# Patient Record
Sex: FEMALE | Race: WHITE | Employment: UNEMPLOYED | URBAN - METROPOLITAN AREA
[De-identification: names, ages, dates, MRNs, and addresses within clinical notes are randomized per-mention and may not be internally consistent; named-entity substitution may affect disease eponyms.]

---

## 2017-05-13 ENCOUNTER — HOSPITAL ENCOUNTER (EMERGENCY)
Age: 57
Discharge: HOME | End: 2017-05-13
Attending: EMERGENCY MEDICINE
Payer: COMMERCIAL

## 2017-05-13 ENCOUNTER — APPOINTMENT (OUTPATIENT)
Dept: GENERAL RADIOLOGY | Age: 57
End: 2017-05-13
Attending: EMERGENCY MEDICINE
Payer: COMMERCIAL

## 2017-05-13 VITALS
SYSTOLIC BLOOD PRESSURE: 125 MMHG | RESPIRATION RATE: 16 BRPM | DIASTOLIC BLOOD PRESSURE: 74 MMHG | HEART RATE: 80 BPM | BODY MASS INDEX: 31.59 KG/M2 | HEIGHT: 65 IN | WEIGHT: 189.6 LBS | TEMPERATURE: 98.7 F | OXYGEN SATURATION: 96 %

## 2017-05-13 DIAGNOSIS — R05.9 COUGH: ICD-10-CM

## 2017-05-13 DIAGNOSIS — R53.83 MALAISE AND FATIGUE: Primary | ICD-10-CM

## 2017-05-13 DIAGNOSIS — R53.81 MALAISE AND FATIGUE: Primary | ICD-10-CM

## 2017-05-13 LAB
ANION GAP BLD CALC-SCNC: 10 MMOL/L (ref 5–15)
BASOPHILS # BLD AUTO: 0 K/UL (ref 0–0.1)
BASOPHILS # BLD: 0 % (ref 0–1)
BUN SERPL-MCNC: 14 MG/DL (ref 6–20)
BUN/CREAT SERPL: 21 (ref 12–20)
CALCIUM SERPL-MCNC: 9.2 MG/DL (ref 8.5–10.1)
CHLORIDE SERPL-SCNC: 103 MMOL/L (ref 97–108)
CO2 SERPL-SCNC: 27 MMOL/L (ref 21–32)
CREAT SERPL-MCNC: 0.68 MG/DL (ref 0.55–1.02)
EOSINOPHIL # BLD: 0.5 K/UL (ref 0–0.4)
EOSINOPHIL NFR BLD: 6 % (ref 0–7)
ERYTHROCYTE [DISTWIDTH] IN BLOOD BY AUTOMATED COUNT: 14.1 % (ref 11.5–14.5)
GLUCOSE SERPL-MCNC: 98 MG/DL (ref 65–100)
HCT VFR BLD AUTO: 39.5 % (ref 35–47)
HGB BLD-MCNC: 12.8 G/DL (ref 11.5–16)
LYMPHOCYTES # BLD AUTO: 16 % (ref 12–49)
LYMPHOCYTES # BLD: 1.3 K/UL (ref 0.8–3.5)
MCH RBC QN AUTO: 29.1 PG (ref 26–34)
MCHC RBC AUTO-ENTMCNC: 32.4 G/DL (ref 30–36.5)
MCV RBC AUTO: 89.8 FL (ref 80–99)
MONOCYTES # BLD: 0.8 K/UL (ref 0–1)
MONOCYTES NFR BLD AUTO: 9 % (ref 5–13)
NEUTS SEG # BLD: 5.6 K/UL (ref 1.8–8)
NEUTS SEG NFR BLD AUTO: 69 % (ref 32–75)
PLATELET # BLD AUTO: 191 K/UL (ref 150–400)
POTASSIUM SERPL-SCNC: 4.2 MMOL/L (ref 3.5–5.1)
RBC # BLD AUTO: 4.4 M/UL (ref 3.8–5.2)
SODIUM SERPL-SCNC: 140 MMOL/L (ref 136–145)
WBC # BLD AUTO: 8.2 K/UL (ref 3.6–11)

## 2017-05-13 PROCEDURE — 71020 XR CHEST PA LAT: CPT

## 2017-05-13 PROCEDURE — 80048 BASIC METABOLIC PNL TOTAL CA: CPT | Performed by: EMERGENCY MEDICINE

## 2017-05-13 PROCEDURE — 36415 COLL VENOUS BLD VENIPUNCTURE: CPT | Performed by: EMERGENCY MEDICINE

## 2017-05-13 PROCEDURE — 99283 EMERGENCY DEPT VISIT LOW MDM: CPT

## 2017-05-13 PROCEDURE — 85025 COMPLETE CBC W/AUTO DIFF WBC: CPT | Performed by: EMERGENCY MEDICINE

## 2017-05-13 RX ORDER — AZITHROMYCIN 250 MG/1
TABLET, FILM COATED ORAL
Qty: 6 TAB | Refills: 0 | Status: SHIPPED | OUTPATIENT
Start: 2017-05-13 | End: 2017-05-18

## 2017-05-13 RX ORDER — ESCITALOPRAM OXALATE 10 MG/1
10 TABLET ORAL DAILY
COMMUNITY

## 2017-05-13 RX ORDER — ALBUTEROL SULFATE 90 UG/1
2 AEROSOL, METERED RESPIRATORY (INHALATION)
Qty: 1 INHALER | Refills: 0 | Status: SHIPPED | OUTPATIENT
Start: 2017-05-13

## 2017-05-13 NOTE — LETTER
NOTIFICATION RETURN TO WORK / SCHOOL 
 
5/13/2017 10:13 AM 
 
Ms. Jaron Kent [address] [place] [id number] To Whom It May Concern: 
 
Jaron Kent is currently under the care of [facility] EMERGENCY DEPT. She will return to work/school on: March 14,2017 If there are questions or concerns please have the patient contact our office. Sincerely, Sergio Hernandez RN

## 2017-05-13 NOTE — ED PROVIDER NOTES
HPI Comments: 61 y.o. female with past medical history significant for cholecystectomy who presents with chief complaint of fatigue. Patient who was dx with strep by PCP secondary to positive strep test ~10 days ago presents in ED complaining of no relief despite taking 10 days abx. Patient states she has taken the abx with no improvement in non-productive cough or sore throat and notes \"I feel worse\". She reports SOB, HA, fatigue and mild diarrhea related to abx. Patient denies any current fever but notes she has had intermittent low grade fevers over the \"past few days\" (Tmax ~100-101F). Patient states her next PCP appointment is on \"5/31/17\" but explains that \"I am tired of feeling this way\" and decided to visit ED. She notes she has had frequent colds for the past ~5 months. Patient reports adequate PO intake of fluids but notes she has had loss of appetite lately. Patient denies any recent known exposure of viral illness. She denies any hx of lung disease. Patient denies any abdominal pain, chest pain, vomiting, chills, urinary problems. There are no other acute medical concerns at this time. Social hx: non-smoker. +ETOH use; 4 glasses of wine per week. PCP: Praneeth Sharif MD    Note written by Fran Estes. Sergio Degree, as dictated by Blanca Cleary MD 9:07 AM      The history is provided by the patient. No  was used. No past medical history on file. No past surgical history on file. No family history on file. Social History     Social History    Marital status:      Spouse name: N/A    Number of children: N/A    Years of education: N/A     Occupational History    Not on file.      Social History Main Topics    Smoking status: Not on file    Smokeless tobacco: Not on file    Alcohol use Not on file    Drug use: Not on file    Sexual activity: Not on file     Other Topics Concern    Not on file     Social History Narrative         ALLERGIES: Review of patient's allergies indicates no known allergies. Review of Systems   Constitutional: Positive for appetite change (loss of appetite), fatigue and fever (resolved). Negative for unexpected weight change. HENT: Positive for sore throat. Negative for ear pain, hearing loss, nosebleeds, rhinorrhea and trouble swallowing. Respiratory: Positive for cough (non-productive) and shortness of breath. Negative for chest tightness. Cardiovascular: Negative. Negative for chest pain and palpitations. Gastrointestinal: Positive for diarrhea (mild). Negative for abdominal distention, abdominal pain, blood in stool and vomiting. Endocrine: Negative. Genitourinary: Negative for dysuria and hematuria. Musculoskeletal: Negative. Negative for back pain and myalgias. Skin: Negative. Negative for rash. Allergic/Immunologic: Negative. Neurological: Positive for headaches. Negative for dizziness, syncope, weakness and numbness. Hematological: Negative. Psychiatric/Behavioral: Negative. All other systems reviewed and are negative. Vitals:    05/13/17 0856 05/13/17 0952 05/13/17 1009   BP: 129/76  125/74   Pulse: 89  80   Resp: 15  16   Temp: 98.5 °F (36.9 °C)  98.7 °F (37.1 °C)   SpO2: 98% 95% 96%   Weight: 86 kg (189 lb 9.5 oz)     Height: 5' 5\" (1.651 m)              Physical Exam   Constitutional: She is oriented to person, place, and time. She appears well-developed and well-nourished. No distress. HENT:   Head: Normocephalic and atraumatic. Right Ear: External ear normal.   Left Ear: External ear normal.   Nose: Nose normal.   Mouth/Throat: Oropharynx is clear and moist.   Eyes: Conjunctivae and EOM are normal. Pupils are equal, round, and reactive to light. Neck: Normal range of motion. Neck supple. No JVD present. No thyromegaly present. Cardiovascular: Normal rate, regular rhythm, normal heart sounds and intact distal pulses. No murmur heard.   Pulmonary/Chest: Effort normal and breath sounds normal. No respiratory distress. She has no wheezes. She has no rales. Frequent dry cough. O2 sats at 92% RA. Abdominal: Soft. Bowel sounds are normal. She exhibits no distension. There is no tenderness. Musculoskeletal: Normal range of motion. She exhibits no edema. Neurological: She is alert and oriented to person, place, and time. No cranial nerve deficit. Skin: Skin is warm and dry. No rash noted. Psychiatric: She has a normal mood and affect. Her behavior is normal. Thought content normal.   Note written by Gatito Hidalgo, as dictated by Jaylen Vargas MD 9:07 AM         [facility]  ED Course       Procedures    PROGRESS NOTE:  9:18 AM  ASSESSMENT & PLAN:  Patient has frequent dry cough with sats around 92% RA. Will check chest x-ray. Patient is complaining of persistent fatigue with some SOB. Will check CBC to r/o anemia. PROGRESS NOTE:  10:00 AM  Patient's CBC, chemistry and chest x-ray negative. Will discharge with rx for inhaler, Z pac and antihistamines. Patient should follow up with PCP sometime this week.

## 2017-05-13 NOTE — ED TRIAGE NOTES
Saw pcp last Thursday and dx with strep throat, completed abx and states she \"feels worse. \" Also c/o pain in her temples when she coughs.

## 2017-05-13 NOTE — DISCHARGE INSTRUCTIONS
Cough: Care Instructions  Your Care Instructions  A cough is your body's response to something that bothers your throat or airways. Many things can cause a cough. You might cough because of a cold or the flu, bronchitis, or asthma. Smoking, postnasal drip, allergies, and stomach acid that backs up into your throat also can cause coughs. A cough is a symptom, not a disease. Most coughs stop when the cause, such as a cold, goes away. You can take a few steps at home to cough less and feel better. Follow-up care is a key part of your treatment and safety. Be sure to make and go to all appointments, and call your doctor if you are having problems. It's also a good idea to know your test results and keep a list of the medicines you take. How can you care for yourself at home? · Drink lots of water and other fluids. This helps thin the mucus and soothes a dry or sore throat. Honey or lemon juice in hot water or tea may ease a dry cough. · Take cough medicine as directed by your doctor. · Prop up your head on pillows to help you breathe and ease a dry cough. · Try cough drops to soothe a dry or sore throat. Cough drops don't stop a cough. Medicine-flavored cough drops are no better than candy-flavored drops or hard candy. · Do not smoke. Avoid secondhand smoke. If you need help quitting, talk to your doctor about stop-smoking programs and medicines. These can increase your chances of quitting for good. When should you call for help? Call 911 anytime you think you may need emergency care. For example, call if:  · You have severe trouble breathing. Call your doctor now or seek immediate medical care if:  · You cough up blood. · You have new or worse trouble breathing. · You have a new or higher fever. · You have a new rash.   Watch closely for changes in your health, and be sure to contact your doctor if:  · You cough more deeply or more often, especially if you notice more mucus or a change in the color of your mucus. · You have new symptoms, such as a sore throat, an earache, or sinus pain. · You do not get better as expected. Where can you learn more? Go to http://alicia-bravo.info/. Enter D279 in the search box to learn more about \"Cough: Care Instructions. \"  Current as of: May 27, 2016  Content Version: 11.2  © 4315-5789 [employer]. Care instructions adapted under license by [employer] (which disclaims liability or warranty for this information). If you have questions about a medical condition or this instruction, always ask your healthcare professional. [place] [address] any warranty or liability for your use of this information. Fatigue: Care Instructions  Your Care Instructions  Fatigue is a feeling of tiredness, exhaustion, or lack of energy. You may feel fatigue because of too much or not enough activity. It can also come from stress, lack of sleep, boredom, and poor diet. Many medical problems, such as viral infections, can cause fatigue. Emotional problems, especially depression, are often the cause of fatigue. Fatigue is most often a symptom of another problem. Treatment for fatigue depends on the cause. For example, if you have fatigue because you have a certain health problem, treating this problem also treats your fatigue. If depression or anxiety is the cause, treatment may help. Follow-up care is a key part of your treatment and safety. Be sure to make and go to all appointments, and call your doctor if you are having problems. It's also a good idea to know your test results and keep a list of the medicines you take. How can you care for yourself at home? · Get regular exercise. But don't overdo it. Go back and forth between rest and exercise. · Get plenty of rest.  · Eat a healthy diet. Do not skip meals, especially breakfast.  · Reduce your use of caffeine, tobacco, and alcohol.  Caffeine is most often found in coffee, tea, cola drinks, and chocolate. · Limit medicines that can cause fatigue. This includes tranquilizers and cold and allergy medicines. When should you call for help? Watch closely for changes in your health, and be sure to contact your doctor if:  · You have new symptoms such as fever or a rash. · Your fatigue gets worse. · You have been feeling down, depressed, or hopeless. Or you may have lost interest in things that you usually enjoy. · You are not getting better as expected. Where can you learn more? Go to http://alicia-bravo.info/. Enter D111 in the search box to learn more about \"Fatigue: Care Instructions. \"  Current as of: May 27, 2016  Content Version: 11.2  © 4465-4618 [employer]. Care instructions adapted under license by [employer] (which disclaims liability or warranty for this information). If you have questions about a medical condition or this instruction, always ask your healthcare professional. [place] [address] any warranty or liability for your use of this information. We hope that we have addressed all of your medical concerns. The examination and treatment you received in the Emergency Department were for an emergent problem and were not intended as complete care. It is important that you follow up with your healthcare provider(s) for ongoing care. If your symptoms worsen or do not improve as expected, and you are unable to reach your usual health care provider(s), you should return to the Emergency Department. Today's healthcare is undergoing tremendous change, and patient satisfaction surveys are one of the many tools to assess the quality of medical care. You may receive a survey from the [employer] organization regarding your experience in the Emergency Department. I hope that your experience has been completely positive, particularly the medical care that I provided.   As such, please participate in the survey; anything less than excellent does not meet my expectations or intentions. [facility] and [facility] participate in nationally recognized quality of care measures. If your blood pressure is greater than 120/80, as reported below, we urge that you seek medical care to address the potential of high blood pressure, commonly known as hypertension. Hypertension can be hereditary or can be caused by certain medical conditions, pain, stress, or \"white coat syndrome. \"       Please make an appointment with your health care provider(s) for follow up of your Emergency Department visit. VITALS:   Patient Vitals for the past 8 hrs:   Temp Pulse Resp BP SpO2   05/13/17 0856 98.5 °F (36.9 °C) 89 15 129/76 98 %          Thank you for allowing us to provide you with medical care today. We realize that you have many choices for your emergency care needs. Please choose us in the future for any continued health care needs. Regards,           Santy CURIEL  [place], [address] [place] [address].   Office: [phone]            Recent Results (from the past 24 hour(s))   METABOLIC PANEL, BASIC    Collection Time: 05/13/17  9:30 AM   Result Value Ref Range    Sodium 140 136 - 145 mmol/L    Potassium 4.2 3.5 - 5.1 mmol/L    Chloride 103 97 - 108 mmol/L    CO2 27 21 - 32 mmol/L    Anion gap 10 5 - 15 mmol/L    Glucose 98 65 - 100 mg/dL    BUN 14 6 - 20 MG/DL    Creatinine 0.68 0.55 - 1.02 MG/DL    BUN/Creatinine ratio 21 (H) 12 - 20      GFR est AA >60 >60 ml/min/1.73m2    GFR est non-AA >60 >60 ml/min/1.73m2    Calcium 9.2 8.5 - 10.1 MG/DL   CBC WITH AUTOMATED DIFF    Collection Time: 05/13/17  9:30 AM   Result Value Ref Range    WBC 8.2 3.6 - 11.0 K/uL    RBC 4.40 3.80 - 5.20 M/uL    HGB 12.8 11.5 - 16.0 g/dL    HCT 39.5 35.0 - 47.0 %    MCV 89.8 80.0 - 99.0 FL    MCH 29.1 26.0 - 34.0 PG    MCHC 32.4 30.0 - 36.5 g/dL    RDW 14.1 11.5 - 14.5 %    PLATELET 778 265 - 400 K/uL    NEUTROPHILS 69 32 - 75 %    LYMPHOCYTES 16 12 - 49 %    MONOCYTES 9 5 - 13 %    EOSINOPHILS 6 0 - 7 %    BASOPHILS 0 0 - 1 %    ABS. NEUTROPHILS 5.6 1.8 - 8.0 K/UL    ABS. LYMPHOCYTES 1.3 0.8 - 3.5 K/UL    ABS. MONOCYTES 0.8 0.0 - 1.0 K/UL    ABS. EOSINOPHILS 0.5 (H) 0.0 - 0.4 K/UL    ABS. BASOPHILS 0.0 0.0 - 0.1 K/UL       Xr Chest Pa Lat    Result Date: 5/13/2017  INDICATION:  cough Exam: Chest 2 views. No comparisons. Findings: Cardiomediastinal silhouette is normal. Pulmonary vasculature is not engorged. No focal parenchymal opacities, effusions, or pneumothorax. Right hemidiaphragm is elevated. Normal atelectasis left base Bony thorax is intact. Impression: 1.  Minimal atelectasis left base

## 2017-05-19 ENCOUNTER — HOSPITAL ENCOUNTER (OUTPATIENT)
Dept: LAB | Age: 57
Discharge: HOME | End: 2017-05-19

## 2018-01-16 ENCOUNTER — HOSPITAL ENCOUNTER (OUTPATIENT)
Dept: MAMMOGRAPHY | Age: 58
Discharge: HOME | End: 2018-01-16
Attending: SPECIALIST
Payer: COMMERCIAL

## 2018-01-16 DIAGNOSIS — Z12.39 SPECIAL SCREENING EXAMINATION FOR NEOPLASM OF BREAST: ICD-10-CM

## 2018-01-16 PROCEDURE — 77067 SCR MAMMO BI INCL CAD: CPT

## 2019-05-08 ENCOUNTER — HOSPITAL ENCOUNTER (OUTPATIENT)
Dept: MAMMOGRAPHY | Age: 59
Discharge: HOME | End: 2019-05-08
Attending: SPECIALIST
Payer: COMMERCIAL

## 2019-05-08 DIAGNOSIS — Z12.39 SCREENING BREAST EXAMINATION: ICD-10-CM

## 2019-05-08 PROCEDURE — 77067 SCR MAMMO BI INCL CAD: CPT

## 2020-06-10 ENCOUNTER — HOSPITAL ENCOUNTER (OUTPATIENT)
Dept: MAMMOGRAPHY | Age: 60
Discharge: HOME | End: 2020-06-10
Attending: SPECIALIST
Payer: COMMERCIAL

## 2020-06-10 DIAGNOSIS — Z12.31 VISIT FOR SCREENING MAMMOGRAM: ICD-10-CM

## 2020-06-10 PROCEDURE — 77067 SCR MAMMO BI INCL CAD: CPT

## 2020-10-29 ENCOUNTER — APPOINTMENT (OUTPATIENT)
Dept: CT IMAGING | Age: 60
End: 2020-10-29
Attending: EMERGENCY MEDICINE
Payer: COMMERCIAL

## 2020-10-29 ENCOUNTER — HOSPITAL ENCOUNTER (EMERGENCY)
Age: 60
Discharge: HOME | End: 2020-10-29
Attending: EMERGENCY MEDICINE | Admitting: EMERGENCY MEDICINE
Payer: COMMERCIAL

## 2020-10-29 ENCOUNTER — APPOINTMENT (OUTPATIENT)
Dept: GENERAL RADIOLOGY | Age: 60
End: 2020-10-29
Attending: EMERGENCY MEDICINE
Payer: COMMERCIAL

## 2020-10-29 VITALS
HEART RATE: 74 BPM | RESPIRATION RATE: 16 BRPM | DIASTOLIC BLOOD PRESSURE: 63 MMHG | TEMPERATURE: 99.1 F | OXYGEN SATURATION: 92 % | SYSTOLIC BLOOD PRESSURE: 128 MMHG

## 2020-10-29 DIAGNOSIS — R50.9 FEVER, UNSPECIFIED FEVER CAUSE: Primary | ICD-10-CM

## 2020-10-29 DIAGNOSIS — R53.83 FATIGUE, UNSPECIFIED TYPE: ICD-10-CM

## 2020-10-29 DIAGNOSIS — M79.10 MYALGIA: ICD-10-CM

## 2020-10-29 DIAGNOSIS — R51.9 NONINTRACTABLE HEADACHE, UNSPECIFIED CHRONICITY PATTERN, UNSPECIFIED HEADACHE TYPE: ICD-10-CM

## 2020-10-29 LAB
ALBUMIN SERPL-MCNC: 3.7 G/DL (ref 3.5–5)
ALBUMIN/GLOB SERPL: 0.8 {RATIO} (ref 1.1–2.2)
ALP SERPL-CCNC: 107 U/L (ref 45–117)
ALT SERPL-CCNC: 49 U/L (ref 12–78)
ANION GAP SERPL CALC-SCNC: 10 MMOL/L (ref 5–15)
APPEARANCE UR: CLEAR
AST SERPL-CCNC: 40 U/L (ref 15–37)
BACTERIA URNS QL MICRO: ABNORMAL /HPF
BASOPHILS # BLD: 0 K/UL (ref 0–0.1)
BASOPHILS NFR BLD: 0 % (ref 0–1)
BILIRUB SERPL-MCNC: 0.4 MG/DL (ref 0.2–1)
BILIRUB UR QL: NEGATIVE
BUN SERPL-MCNC: 10 MG/DL (ref 6–20)
BUN/CREAT SERPL: 13 (ref 12–20)
CALCIUM SERPL-MCNC: 9.3 MG/DL (ref 8.5–10.1)
CHLORIDE SERPL-SCNC: 101 MMOL/L (ref 97–108)
CK SERPL-CCNC: 52 U/L (ref 26–192)
CO2 SERPL-SCNC: 26 MMOL/L (ref 21–32)
COLOR UR: ABNORMAL
CREAT SERPL-MCNC: 0.77 MG/DL (ref 0.55–1.02)
DIFFERENTIAL METHOD BLD: ABNORMAL
EOSINOPHIL # BLD: 0.1 K/UL (ref 0–0.4)
EOSINOPHIL NFR BLD: 3 % (ref 0–7)
EPITH CASTS URNS QL MICRO: ABNORMAL /LPF
ERYTHROCYTE [DISTWIDTH] IN BLOOD BY AUTOMATED COUNT: 13.2 % (ref 11.5–14.5)
GLOBULIN SER CALC-MCNC: 4.6 G/DL (ref 2–4)
GLUCOSE SERPL-MCNC: 84 MG/DL (ref 65–100)
GLUCOSE UR STRIP.AUTO-MCNC: NEGATIVE MG/DL
HCT VFR BLD AUTO: 40.2 % (ref 35–47)
HGB BLD-MCNC: 13.1 G/DL (ref 11.5–16)
HGB UR QL STRIP: NEGATIVE
IMM GRANULOCYTES # BLD AUTO: 0 K/UL (ref 0–0.04)
IMM GRANULOCYTES NFR BLD AUTO: 0 % (ref 0–0.5)
KETONES UR QL STRIP.AUTO: NEGATIVE MG/DL
LACTATE SERPL-SCNC: 1.1 MMOL/L (ref 0.4–2)
LEUKOCYTE ESTERASE UR QL STRIP.AUTO: NEGATIVE
LYMPHOCYTES # BLD: 1.4 K/UL (ref 0.8–3.5)
LYMPHOCYTES NFR BLD: 41 % (ref 12–49)
MAGNESIUM SERPL-MCNC: 2.2 MG/DL (ref 1.6–2.4)
MCH RBC QN AUTO: 28.9 PG (ref 26–34)
MCHC RBC AUTO-ENTMCNC: 32.6 G/DL (ref 30–36.5)
MCV RBC AUTO: 88.7 FL (ref 80–99)
MONOCYTES # BLD: 0.4 K/UL (ref 0–1)
MONOCYTES NFR BLD: 11 % (ref 5–13)
NEUTS SEG # BLD: 1.5 K/UL (ref 1.8–8)
NEUTS SEG NFR BLD: 44 % (ref 32–75)
NITRITE UR QL STRIP.AUTO: NEGATIVE
NRBC # BLD: 0 K/UL (ref 0–0.01)
NRBC BLD-RTO: 0 PER 100 WBC
PH UR STRIP: 6.5 [PH] (ref 5–8)
PLATELET # BLD AUTO: 135 K/UL (ref 150–400)
PMV BLD AUTO: 11.7 FL (ref 8.9–12.9)
POTASSIUM SERPL-SCNC: 3.8 MMOL/L (ref 3.5–5.1)
PROT SERPL-MCNC: 8.3 G/DL (ref 6.4–8.2)
PROT UR STRIP-MCNC: NEGATIVE MG/DL
RBC # BLD AUTO: 4.53 M/UL (ref 3.8–5.2)
RBC #/AREA URNS HPF: ABNORMAL /HPF (ref 0–5)
SODIUM SERPL-SCNC: 137 MMOL/L (ref 136–145)
SP GR UR REFRACTOMETRY: 1.01 (ref 1–1.03)
TROPONIN I SERPL-MCNC: <0.05 NG/ML
UROBILINOGEN UR QL STRIP.AUTO: 0.2 EU/DL (ref 0.2–1)
WBC # BLD AUTO: 3.3 K/UL (ref 3.6–11)
WBC URNS QL MICRO: ABNORMAL /HPF (ref 0–4)

## 2020-10-29 PROCEDURE — 93005 ELECTROCARDIOGRAM TRACING: CPT

## 2020-10-29 PROCEDURE — 80053 COMPREHEN METABOLIC PANEL: CPT

## 2020-10-29 PROCEDURE — 96374 THER/PROPH/DIAG INJ IV PUSH: CPT

## 2020-10-29 PROCEDURE — 83605 ASSAY OF LACTIC ACID: CPT

## 2020-10-29 PROCEDURE — 87040 BLOOD CULTURE FOR BACTERIA: CPT

## 2020-10-29 PROCEDURE — 74011250636 HC RX REV CODE- 250/636: Performed by: EMERGENCY MEDICINE

## 2020-10-29 PROCEDURE — 70486 CT MAXILLOFACIAL W/O DYE: CPT

## 2020-10-29 PROCEDURE — 81001 URINALYSIS AUTO W/SCOPE: CPT

## 2020-10-29 PROCEDURE — 74011000250 HC RX REV CODE- 250: Performed by: EMERGENCY MEDICINE

## 2020-10-29 PROCEDURE — 85025 COMPLETE CBC W/AUTO DIFF WBC: CPT

## 2020-10-29 PROCEDURE — 83735 ASSAY OF MAGNESIUM: CPT

## 2020-10-29 PROCEDURE — 82550 ASSAY OF CK (CPK): CPT

## 2020-10-29 PROCEDURE — 84484 ASSAY OF TROPONIN QUANT: CPT

## 2020-10-29 PROCEDURE — 96375 TX/PRO/DX INJ NEW DRUG ADDON: CPT

## 2020-10-29 PROCEDURE — 36415 COLL VENOUS BLD VENIPUNCTURE: CPT

## 2020-10-29 PROCEDURE — 71046 X-RAY EXAM CHEST 2 VIEWS: CPT

## 2020-10-29 PROCEDURE — 99283 EMERGENCY DEPT VISIT LOW MDM: CPT

## 2020-10-29 RX ORDER — DIPHENHYDRAMINE HYDROCHLORIDE 50 MG/ML
25 INJECTION, SOLUTION INTRAMUSCULAR; INTRAVENOUS
Status: COMPLETED | OUTPATIENT
Start: 2020-10-29 | End: 2020-10-29

## 2020-10-29 RX ORDER — PROCHLORPERAZINE EDISYLATE 5 MG/ML
10 INJECTION INTRAMUSCULAR; INTRAVENOUS
Status: DISCONTINUED | OUTPATIENT
Start: 2020-10-29 | End: 2020-10-29 | Stop reason: SDUPTHER

## 2020-10-29 RX ADMIN — SODIUM CHLORIDE 1000 ML: 900 INJECTION, SOLUTION INTRAVENOUS at 20:56

## 2020-10-29 RX ADMIN — PROCHLORPERAZINE EDISYLATE 10 MG: 5 INJECTION INTRAMUSCULAR; INTRAVENOUS at 20:57

## 2020-10-29 RX ADMIN — DIPHENHYDRAMINE HYDROCHLORIDE 25 MG: 50 INJECTION, SOLUTION INTRAMUSCULAR; INTRAVENOUS at 20:57

## 2020-10-30 LAB
ATRIAL RATE: 75 BPM
CALCULATED P AXIS, ECG09: 30 DEGREES
CALCULATED R AXIS, ECG10: -10 DEGREES
CALCULATED T AXIS, ECG11: 14 DEGREES
DIAGNOSIS, 93000: NORMAL
P-R INTERVAL, ECG05: 162 MS
Q-T INTERVAL, ECG07: 408 MS
QRS DURATION, ECG06: 72 MS
QTC CALCULATION (BEZET), ECG08: 455 MS
VENTRICULAR RATE, ECG03: 75 BPM

## 2020-10-30 NOTE — DISCHARGE INSTRUCTIONS
Patient Education        Learning About Coronavirus [phone])  Coronavirus [phone]): Overview  What is coronavirus ([id number])? The coronavirus disease (COVID-19) is caused by a virus. It is an illness that was first found in December 2019. It has since spread worldwide. The virus can cause fever, cough, and trouble breathing. In severe cases, it can cause pneumonia and make it hard to breathe without help. It can cause death. This virus spreads person-to-person through droplets from coughing and sneezing. It can also spread when you are close to someone who is infected. And it can spread when you touch something that has the virus on it, such as a doorknob or a tabletop. Coronaviruses are a large group of viruses. They cause the common cold. They also cause more serious illnesses like [place] respiratory syndrome (MERS) and severe acute respiratory syndrome (SARS). COVID-19 is caused by a novel coronavirus. That means it's a new type that has not been seen in people before. How is COVID-19 treated? Mild illness can be treated at home, but more serious illness needs to be treated in the hospital. Treatment may include medicines to reduce symptoms, plus breathing support such as oxygen therapy or a ventilator. Other treatments, such as antiviral medicines, may help people who have COVID-19. What can you do to protect yourself from COVID-19? The best way to protect yourself from getting sick is to:  · Avoid areas where there is an outbreak. · Avoid contact with people who may be infected. · Avoid crowds and try to stay at least 6 feet away from other people. · Wash your hands often, especially after you cough or sneeze. Use soap and water, and scrub for at least 20 seconds. If soap and water aren't available, use an alcohol-based hand . · Avoid touching your mouth, nose, and eyes. What can you do to avoid spreading the virus to others?   To help avoid spreading the virus to others:  · [employer] your hands often with soap or alcohol-based hand sanitizers. · Cover your mouth with a tissue when you cough or sneeze. Then throw the tissue in the trash. · Use a disinfectant to clean things that you touch often. These include doorknobs, remote controls, phones, and handles on your refrigerator and microwave. And don't forget countertops, tabletops, bathrooms, and computer keyboards. · Wear a cloth face cover if you have to go to public areas. If you know or suspect that you have COVID-19:  · Stay home. Don't go to school, work, or public areas. And don't use public transportation, ride-shares, or taxis unless you have no choice. · Leave your home only if you need to get medical care or testing. But call the doctor's office first so they know you're coming. And wear a face cover. · Limit contact with people in your home. If possible, stay in a separate bedroom and use a separate bathroom. · Wear a face cover whenever you're around other people. It can help stop the spread of the virus when you cough or sneeze. · Clean and disinfect your home every day. Use household  and disinfectant wipes or sprays. Take special care to clean things that you grab with your hands. · Self-isolate until it's safe to be around others again. ? If you have symptoms, it's safe when you haven't had a fever for 3 days and your symptoms have improved and it's been at least 10 days since your symptoms started. ? If you were exposed to the virus but don't have symptoms, it's safe to be around others 14 days after exposure. ? Talk to your doctor about whether you also need testing, especially if you have a weakened immune system. When to call for help  Call 911 anytime you think you may need emergency care. For example, call if:  · You have severe trouble breathing. (You can't talk at all.)  · You have constant chest pain or pressure. · You are severely dizzy or lightheaded.   · You are confused or can't think clearly. · Your face and lips have a blue color. · You passed out (lost consciousness) or are very hard to wake up. Call your doctor now if you develop symptoms such as:  · Shortness of breath. · Fever. · Cough. If you need to get care, call ahead to the doctor's office for instructions before you go. Make sure you wear a face cover to prevent exposing other people to the virus. Where can you get the latest information? The following health organizations are tracking and studying this virus. Their websites contain the most up-to-date information. Elizabeth Patel also learn what to do if you think you may have been exposed to the virus. · U.[place]. Centers for Disease Control and Prevention (CDC): The CDC provides updated news about the disease and travel advice. The website also tells you how to prevent the spread of infection. www.cdc.gov  · World Health Organization [facility]: WHO offers information about the virus outbreaks. WHO also has travel advice. www.who.int  Current as of: July 10, 2020               Content Version: 12.6  © 2006-2020 [employer]. Care instructions adapted under license by [employer] (which disclaims liability or warranty for this information). If you have questions about a medical condition or this instruction, always ask your healthcare professional. [place] [address] any warranty or liability for your use of this information. Patient Education        Coronavirus ([id number]): Care Instructions  Overview  The coronavirus disease (COVID-19) is caused by a virus. Symptoms may include a fever, a cough, and shortness of breath. It mainly spreads person-to-person through droplets from coughing and sneezing. The virus also can spread when people are in close contact with someone who is infected. Most people have mild symptoms and can take care of themselves at home.  If their symptoms get worse, they may need care in a hospital. Treatment may include medicines to reduce symptoms, plus breathing support such as oxygen therapy or a ventilator. It's important to not spread the virus to others. If you have COVID-19, wear a face cover anytime you are around other people. You need to isolate yourself while you are sick. Leave your home only if you need to get medical care or testing. Follow-up care is a key part of your treatment and safety. Be sure to make and go to all appointments, and call your doctor if you are having problems. It's also a good idea to know your test results and keep a list of the medicines you take. How can you care for yourself at home? · Get extra rest. It can help you feel better. · Drink plenty of fluids. This helps replace fluids lost from fever. Fluids also help ease a scratchy throat. Water, soup, fruit juice, and hot tea with lemon are good choices. · Take acetaminophen (such as Tylenol) to reduce a fever. It may also help with muscle aches. Read and follow all instructions on the label. · Use petroleum jelly on sore skin. This can help if the skin around your nose and lips becomes sore from rubbing a lot with tissues. Tips for self-isolation  · Limit contact with people in your home. If possible, stay in a separate bedroom and use a separate bathroom. · Wear a cloth face cover when you are around other people. It can help stop the spread of the virus when you cough or sneeze. · If you have to leave home, avoid crowds and try to stay at least 6 feet away from other people. · Avoid contact with pets and other animals. · Cover your mouth and nose with a tissue when you cough or sneeze. Then throw it in the trash right away. · Wash your hands often, especially after you cough or sneeze. Use soap and water, and scrub for at least 20 seconds. If soap and water aren't available, use an alcohol-based hand . · Don't share personal household items.  These include bedding, towels, cups and glasses, and eating utensils. · [address] in the warmest water allowed for the fabric type, and dry it completely. It's okay to wash other people's laundry with yours. · Clean and disinfect your home every day. Use household  and disinfectant wipes or sprays. Take special care to clean things that you grab with your hands. These include doorknobs, remote controls, phones, and handles on your refrigerator and microwave. And don't forget countertops, tabletops, bathrooms, and computer keyboards. When you can end self-isolation  · If you know or suspect that you have COVID-19, stay in self-isolation until:  ? You haven't had a fever for 3 days, and  ? Your symptoms have improved, and  ? It's been at least 10 days since your symptoms started. · Talk to your doctor about whether you also need testing, especially if you have a weakened immune system. When should you call for help? Call 911 anytime you think you may need emergency care. For example, call if you have life-threatening symptoms, such as:    · You have severe trouble breathing. (You can't talk at all.)     · You have constant chest pain or pressure.     · You are severely dizzy or lightheaded.     · You are confused or can't think clearly.     · Your face and lips have a blue color.     · You pass out (lose consciousness) or are very hard to wake up. Call your doctor now or seek immediate medical care if:    · You have moderate trouble breathing. (You can't speak a full sentence.)     · You are coughing up blood (more than about 1 teaspoon).     · You have signs of low blood pressure. These include feeling lightheaded; being too weak to stand; and having cold, pale, clammy skin. Watch closely for changes in your health, and be sure to contact your doctor if:    · Your symptoms get worse.     · You are not getting better as expected. Call before you go to the doctor's office. Follow their instructions. And wear a cloth face cover.   Current as of: July 10, 2020               Content Version: 12.6  © 8646-4866 [employer]. Care instructions adapted under license by [employer] (which disclaims liability or warranty for this information). If you have questions about a medical condition or this instruction, always ask your healthcare professional. [address] any warranty or liability for your use of this information. Patient Education        Fatigue: Care Instructions  Your Care Instructions     Fatigue is a feeling of tiredness, exhaustion, or lack of energy. You may feel fatigue because of too much or not enough activity. It can also come from stress, lack of sleep, boredom, and poor diet. Many medical problems, such as viral infections, can cause fatigue. Emotional problems, especially depression, are often the cause of fatigue. Fatigue is most often a symptom of another problem. Treatment for fatigue depends on the cause. For example, if you have fatigue because you have a certain health problem, treating this problem also treats your fatigue. If depression or anxiety is the cause, treatment may help. Follow-up care is a key part of your treatment and safety. Be sure to make and go to all appointments, and call your doctor if you are having problems. It's also a good idea to know your test results and keep a list of the medicines you take. How can you care for yourself at home? · Get regular exercise. But don't overdo it. Go back and forth between rest and exercise. · Get plenty of rest.  · Eat a healthy diet. Do not skip meals, especially breakfast.  · Reduce your use of caffeine, tobacco, and alcohol. Caffeine is most often found in coffee, tea, cola drinks, and chocolate. · Limit medicines that can cause fatigue. This includes tranquilizers and cold and allergy medicines. When should you call for help?   Watch closely for changes in your health, and be sure to contact your doctor if:    · You have new symptoms such as fever or a rash.     · Your fatigue gets worse.     · You have been feeling down, depressed, or hopeless. Or you may have lost interest in things that you usually enjoy.     · You are not getting better as expected. Where can you learn more? Go to http://www.gray.com/  Enter [id number] in the search box to learn more about \"Fatigue: Care Instructions. \"  Current as of: June 26, 2019               Content Version: 12.6  © 9475-9547 [employer]. Care instructions adapted under license by [employer] (which disclaims liability or warranty for this information). If you have questions about a medical condition or this instruction, always ask your healthcare professional. [address] any warranty or liability for your use of this information. Patient Education        Learning About Fever  What is a fever? A fever is a high body temperature. It's one way your body fights being sick. A fever shows that the body is responding to infection or other illnesses, both minor and severe. A fever is a symptom, not an illness by itself. A fever can be a sign that you are ill, but most fevers are not caused by a serious problem. You may have a fever with a minor illness, such as a cold. But sometimes a very serious infection may cause little or no fever. It is important to look at other symptoms, other conditions you have, and how you feel in general. In children, notice how they act and see what symptoms they complain of. What is a normal body temperature? A normal body temperature is about 98. 6ºF. Some people have a normal temperature that is a little higher or a little lower than this. Your temperature may be a little lower in the morning than it is later in the day. It may go up during hot weather or when you exercise, wear heavy clothes, or take a hot bath. Your temperature may also be different depending on how you take it.  A temperature taken in the mouth (oral) or under the arm may be a little lower than your core temperature (rectal). What is a fever temperature? A core temperature of 100.4°F or above is considered a fever. What can cause a fever? A fever may be caused by:  · Infections. This is the most common cause of a fever. Examples of infections that can cause a fever include the flu, a kidney infection, or pneumonia. · Some medicines. · Severe trauma or injury, such as a heart attack, stroke, heatstroke, or burns. · Other medical conditions, such as arthritis and some cancers. How can you treat a fever at home? · Ask your doctor if you can take an over-the-counter pain medicine, such as acetaminophen (Tylenol), ibuprofen (Advil, Motrin), or naproxen (Aleve). Be safe with medicines. Read and follow all instructions on the label. · To prevent dehydration, drink plenty of fluids. Choose water and other caffeine-free clear liquids until you feel better. If you have kidney, heart, or liver disease and have to limit fluids, talk with your doctor before you increase the amount of fluids you drink. Follow-up care is a key part of your treatment and safety. Be sure to make and go to all appointments, and call your doctor if you are having problems. It's also a good idea to know your test results and keep a list of the medicines you take. Where can you learn more? Go to http://www.gray.com/  Enter G732 in the search box to learn more about \"Learning About Fever. \"  Current as of: June 26, 2019               Content Version: 12.6  © 0496-0443 [employer]. Care instructions adapted under license by [employer] (which disclaims liability or warranty for this information). If you have questions about a medical condition or this instruction, always ask your healthcare professional. [address] any warranty or liability for your use of this information. We hope that we have addressed all of your medical concerns. The examination and treatment you received in the Emergency Department were for an emergent problem and were not intended as complete care. It is important that you follow up with your healthcare provider(s) for ongoing care. If your symptoms worsen or do not improve as expected, and you are unable to reach your usual health care provider(s), you should return to the Emergency Department. Today's healthcare is undergoing tremendous change, and patient satisfaction surveys are one of the many tools to assess the quality of medical care. You may receive a survey from the [employer] regarding your experience in the Emergency Department. I hope that your experience has been completely positive, particularly the medical care that I provided. As such, please participate in the survey; anything less than excellent does not meet my expectations or intentions. [facility] and [address] [place] [address] participate in nationally recognized quality of care measures. If your blood pressure is greater than 120/80, as reported below, we urge that you seek medical care to address the potential of high blood pressure, commonly known as hypertension. Hypertension can be hereditary or can be caused by certain medical conditions, pain, stress, or \"white coat syndrome. \"       Please make an appointment with your health care provider(s) for follow up of your Emergency Department visit. VITALS:   Patient Vitals for the past 8 hrs:   Temp Pulse Resp BP SpO2   10/29/20 2005 99.1 °F (37.3 °C) 74 16 125/81 96 %          Thank you for allowing us to provide you with medical care today. We realize that you have many choices for your emergency care needs. Please choose us in the future for any continued health care needs. Sabrina Isbell  Nila Finder, [employer]. Office: [phone]            Recent Results (from the past 24 hour(s))   CBC WITH AUTOMATED DIFF    Collection Time: 10/29/20  8:19 PM   Result Value Ref Range    WBC 3.3 (L) 3.6 - 11.0 K/uL    RBC 4.53 3.80 - 5.20 M/uL    HGB 13.1 11.5 - 16.0 g/dL    HCT 40.2 35.0 - 47.0 %    MCV 88.7 80.0 - 99.0 FL    MCH 28.9 26.0 - 34.0 PG    MCHC 32.6 30.0 - 36.5 g/dL    RDW 13.2 11.5 - 14.5 %    PLATELET 864 (L) 436 - 400 K/uL    MPV 11.7 8.9 - 12.9 FL    NRBC 0.0 0  WBC    ABSOLUTE NRBC 0.00 0.00 - 0.01 K/uL    NEUTROPHILS 44 32 - 75 %    LYMPHOCYTES 41 12 - 49 %    MONOCYTES 11 5 - 13 %    EOSINOPHILS 3 0 - 7 %    BASOPHILS 0 0 - 1 %    IMMATURE GRANULOCYTES 0 0.0 - 0.5 %    ABS. NEUTROPHILS 1.5 (L) 1.8 - 8.0 K/UL    ABS. LYMPHOCYTES 1.4 0.8 - 3.5 K/UL    ABS. MONOCYTES 0.4 0.0 - 1.0 K/UL    ABS. EOSINOPHILS 0.1 0.0 - 0.4 K/UL    ABS. BASOPHILS 0.0 0.0 - 0.1 K/UL    ABS. IMM. GRANS. 0.0 0.00 - 0.04 K/UL    DF AUTOMATED     MAGNESIUM    Collection Time: 10/29/20  8:19 PM   Result Value Ref Range    Magnesium 2.2 1.6 - 2.4 mg/dL   METABOLIC PANEL, COMPREHENSIVE    Collection Time: 10/29/20  8:19 PM   Result Value Ref Range    Sodium 137 136 - 145 mmol/L    Potassium 3.8 3.5 - 5.1 mmol/L    Chloride 101 97 - 108 mmol/L    CO2 26 21 - 32 mmol/L    Anion gap 10 5 - 15 mmol/L    Glucose 84 65 - 100 mg/dL    BUN 10 6 - 20 MG/DL    Creatinine 0.77 0.55 - 1.02 MG/DL    BUN/Creatinine ratio 13 12 - 20      GFR est AA >60 >60 ml/min/1.73m2    GFR est non-AA >60 >60 ml/min/1.73m2    Calcium 9.3 8.5 - 10.1 MG/DL    Bilirubin, total 0.4 0.2 - 1.0 MG/DL    ALT (SGPT) 49 12 - 78 U/L    AST (SGOT) 40 (H) 15 - 37 U/L    Alk.  phosphatase 107 45 - 117 U/L    Protein, total 8.3 (H) 6.4 - 8.2 g/dL    Albumin 3.7 3.5 - 5.0 g/dL    Globulin 4.6 (H) 2.0 - 4.0 g/dL    A-G Ratio 0.8 (L) 1.1 - 2.2     TROPONIN I    Collection Time: 10/29/20  8:19 PM   Result Value Ref Range    Troponin-I, Qt. <0.05 <0.05 ng/mL   CK W/ REFLX CKMB Collection Time: 10/29/20  8:19 PM   Result Value Ref Range    CK 52 26 - 192 U/L   LACTIC ACID    Collection Time: 10/29/20  8:50 PM   Result Value Ref Range    Lactic acid 1.1 0.4 - 2.0 MMOL/L       Xr Chest Pa Lat    Result Date: 10/29/2020  INDICATION: . Chest Pain Additional history: COMPARISON: Previous chest xray, 5/13/2017. Angie Bradshaw FINDINGS: PA and lateral view of the chest. . Lines/tubes/surgical: None. Heart/mediastinum: Unremarkable. Lungs/pleura:  No focal consolidation or mass. No visualized pleural effusion or pneumothorax. Additional Comments: Surgical clips in the right upper quadrant. .    IMPRESSION: 1. No radiographic evidence of acute cardiopulmonary disease. Ct Maxillofacial Wo Cont    Result Date: 10/29/2020  EXAM: CT MAXILLOFACIAL WO CONT INDICATION: sinus pain, headache, fever for 1 week COMPARISON: None. CONTRAST:   None. TECHNIQUE:  Multislice helical CT of the facial bones was performed in the axial plane without intravenous contrast administration. Coronal and sagittal reformations were generated. CT dose reduction was achieved through use of a standardized protocol tailored for this examination and automatic exposure control for dose modulation. FINDINGS: Bones: There is no fracture or other osseous abnormality. Paranasal sinuses: Clear. Orbits: The globes, optic nerves, and extraocular muscles are within normal limits. . Base of brain and soft tissues: Within normal limits. No evidence of mass. .     IMPRESSION: No fracture.  Paranasal sinuses are well aerated

## 2020-10-30 NOTE — ED PROVIDER NOTES
HPI   60-year-old female presents with myalgias, headache, sinus pressure. Patient states she was seen by physician about 2 weeks ago and treated with an antibiotic for presumed sinusitis. Patient states the symptoms had improved but then worsened. She is reports having fever for the past week. She has been seen 3 times and had Covid testing in the past week all reportedly negative. She reports onset of diarrhea yesterday, no bloody or black stools. She complains of headache. She denies vision changes or weakness or numbness. She complains of diffuse myalgias. Her last dose of Tylenol Motrin was in the morning. No past medical history on file. Past Surgical History:   Procedure Laterality Date    HX CHOLECYSTECTOMY           No family history on file. Social History     Socioeconomic History    Marital status:      Spouse name: Not on file    Number of children: Not on file    Years of education: Not on file    Highest education level: Not on file   Occupational History    Not on file   Social Needs    Financial resource strain: Not on file    Food insecurity     Worry: Not on file     Inability: Not on file    Transportation needs     Medical: Not on file     Non-medical: Not on file   Tobacco Use    Smoking status: Never Smoker   Substance and Sexual Activity    Alcohol use:  Yes     Alcohol/week: 4.0 standard drinks     Types: 4 Glasses of wine per week    Drug use: No    Sexual activity: Not on file   Lifestyle    Physical activity     Days per week: Not on file     Minutes per session: Not on file    Stress: Not on file   Relationships    Social connections     Talks on phone: Not on file     Gets together: Not on file     Attends [employer] service: Not on file     Active member of club or organization: Not on file     Attends meetings of clubs or organizations: Not on file     Relationship status: Not on file    Intimate partner violence     Fear of current or ex partner: Not on file     Emotionally abused: Not on file     Physically abused: Not on file     Forced sexual activity: Not on file   Other Topics Concern    Not on file   Social History Narrative    Not on file         ALLERGIES: Patient has no known allergies. Review of Systems   Constitutional: Positive for chills, fatigue and fever. HENT: Positive for sinus pressure, sinus pain and sore throat. Respiratory: Positive for cough. Negative for shortness of breath. Cardiovascular: Negative for chest pain. Gastrointestinal: Positive for diarrhea. Negative for blood in stool, constipation, nausea and vomiting. Genitourinary: Negative for dysuria and hematuria. Musculoskeletal: Positive for myalgias. Skin: Negative for rash and wound. Neurological: Positive for headaches. Negative for weakness and numbness. All other systems reviewed and are negative.       Vitals:    10/29/20 2005 10/29/20 2313 10/29/20 2315 10/29/20 2324   BP: 125/81  128/63    Pulse: 74      Resp: 16      Temp: 99.1 °F (37.3 °C)      SpO2: 96% 93%  92%            Physical Exam   Physical Examination: General appearance - alert, well appearing, and in no distress, oriented to person, place, and time and normal appearing weight  Eyes - pupils equal and reactive, extraocular eye movements intact  Neck - supple, no significant adenopathy, no nuchal rigidity or menginismus  Chest - clear to auscultation, no wheezes, rales or rhonchi, symmetric air entry  Heart - normal rate, regular rhythm, normal S1, S2, no murmurs, rubs, clicks or gallops  Abdomen - soft, nontender, nondistended, no masses or organomegaly  Back exam - full range of motion, no tenderness, palpable spasm or pain on motion  Neurological - alert, oriented, normal speech, no focal findings or movement disorder noted  Musculoskeletal - no joint tenderness, deformity or swelling  Extremities - peripheral pulses normal, no pedal edema, no clubbing or cyanosis  Skin - normal coloration and turgor, no rashes, no suspicious skin lesions noted  MDM  Number of Diagnoses or Management Options  Fatigue, unspecified type:   Fever, unspecified fever cause:   Myalgia:   Nonintractable headache, unspecified chronicity pattern, unspecified headache type:      Amount and/or Complexity of Data Reviewed  Clinical lab tests: ordered and reviewed  Tests in the radiology section of CPT®: ordered and reviewed  Decide to obtain previous medical records or to obtain history from someone other than the patient: yes  Review and summarize past medical records: yes  Independent visualization of images, tracings, or specimens: yes    Patient Progress  Patient progress: improved         Procedures  ED EKG interpretation:  Rhythm: normal sinus rhythm; and regular . Rate (approx.): 75; Axis: left axis deviation; P wave: normal; QRS interval: normal ; ST/T wave: non-specific changes; poor r wave progression  EKG documented by Dustin Chin MD    Patient nontoxic. Vital signs are stable. Suspect viral illness. Discussed possibility of possible Covid infection with false negative test results. Patient to follow-up with PCP or return to ED for worsening symptoms.

## 2020-11-04 LAB
BACTERIA SPEC CULT: NORMAL
SERVICE CMNT-IMP: NORMAL

## 2021-09-24 ENCOUNTER — TRANSCRIBE ORDER (OUTPATIENT)
Dept: SCHEDULING | Age: 61
End: 2021-09-24

## 2021-09-24 DIAGNOSIS — Z12.31 VISIT FOR SCREENING MAMMOGRAM: Primary | ICD-10-CM

## 2021-11-02 ENCOUNTER — HOSPITAL ENCOUNTER (OUTPATIENT)
Dept: MAMMOGRAPHY | Age: 61
Discharge: HOME | End: 2021-11-02
Attending: SPECIALIST
Payer: COMMERCIAL

## 2021-11-02 DIAGNOSIS — Z12.31 VISIT FOR SCREENING MAMMOGRAM: ICD-10-CM

## 2021-11-02 PROCEDURE — 77063 BREAST TOMOSYNTHESIS BI: CPT

## 2022-02-21 ENCOUNTER — OFFICE VISIT (OUTPATIENT)
Dept: HEMATOLOGY | Age: 62
End: 2022-02-21
Payer: COMMERCIAL

## 2022-02-21 VITALS — BODY MASS INDEX: 31.99 KG/M2 | WEIGHT: 192 LBS | HEIGHT: 65 IN

## 2022-02-21 DIAGNOSIS — K74.60 CIRRHOSIS OF LIVER WITHOUT ASCITES, UNSPECIFIED HEPATIC CIRRHOSIS TYPE (HCC): Primary | ICD-10-CM

## 2022-02-21 LAB
25(OH)D3 SERPL-MCNC: 12.7 NG/ML (ref 30–100)
ALBUMIN SERPL-MCNC: 4.5 G/DL (ref 3.5–5)
ALBUMIN/GLOB SERPL: 1.1 {RATIO} (ref 1.1–2.2)
ALP SERPL-CCNC: 148 U/L (ref 45–117)
ALT SERPL-CCNC: 56 U/L (ref 12–78)
AST SERPL-CCNC: 42 U/L (ref 15–37)
BILIRUB DIRECT SERPL-MCNC: 0.2 MG/DL (ref 0–0.2)
BILIRUB SERPL-MCNC: 0.7 MG/DL (ref 0.2–1)
FOLATE SERPL-MCNC: 16.7 NG/ML (ref 5–21)
GLOBULIN SER CALC-MCNC: 4.2 G/DL (ref 2–4)
PROT SERPL-MCNC: 8.7 G/DL (ref 6.4–8.2)
TSH SERPL DL<=0.05 MIU/L-ACNC: 2.03 UIU/ML (ref 0.36–3.74)
VIT B12 SERPL-MCNC: 786 PG/ML (ref 193–986)

## 2022-02-21 PROCEDURE — 91200 LIVER ELASTOGRAPHY: CPT | Performed by: PHYSICIAN ASSISTANT

## 2022-02-21 PROCEDURE — 99204 OFFICE O/P NEW MOD 45 MIN: CPT | Performed by: PHYSICIAN ASSISTANT

## 2022-02-21 RX ORDER — HYOSCYAMINE SULFATE 0.12 MG/1
0.12 TABLET SUBLINGUAL
COMMUNITY

## 2022-02-21 RX ORDER — ESCITALOPRAM OXALATE 10 MG/1
10 TABLET ORAL DAILY
COMMUNITY

## 2022-02-21 NOTE — PROGRESS NOTES
[address] [facility], [place], [address], [facility] Yuli Montes, [place]      Donold Dakin, PA-C Daron Council, [facility]     April S Yoan, [facility]   ZULEMA Engle-MARTHA Drake, [facility]       Ely Aguilar [facility] [address]    at [address], [address] [place] [address], [facility] 22.    [phone]    FAX: [address] [place] [address], [address]    [phone]    FAX: [phone]       Patient Care Team:  Kayden Rodriguez DO as PCP - General (Family Medicine)  Obed Morales MD (Gastroenterology)    The clinicians listed above have asked me to see Dorie Spatz in consultation regarding management of cirrhosis of undefined cause. All medical records sent by the referring physicians were reviewed including imaging studies. The patient is a 57 y.o.  female who was found to have chronic liver disease and cirrhosis in 1/2022 when she underwent imaging studies to assess long-standing history of abdominal pain. The imaging suggested cirrhotic morphology of the liver. Lab values have shown normal (upper limit) liver enzymes and function, minimal elevation of ALP, borderline low platelet count. An assessment of liver fibrosis with biopsy or elastography has not been performed. Serologic evaluation for markers of chronic liver disease was negative for HCV, HBV, YOANA, ASMA, AMA, a low ceruloplasmin, alpha-1-antitrypsin, an elevation in ferritin, or FE saturation. The patient has not developed any of the major complications of cirrhosis to date. The patient has the following symptoms which could be attributed to the liver disorder:  profound fatigue, diffuse abdominal pain, and nausea.     The patient completes all daily activities without any functional limitations. ASSESSMENT AND PLAN:  Cirrhosis  The diagnosis of cirrhosis is based upon in-office Fibroscan and imaging studies. 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 The patient has normal liver function. The patient has never developed any complications of cirrhosis to date. Liver transaminases are borderline elevated. ALP is normal.  Liver function is normal.  The platelet count is borderline depressed. Based upon laboratory studies Fibroscan, and imaging, the patient appears to have cirrhosis. I have shared the results of these findings in detail with the patient today. We have briefly outlined the natural hisotry and progression of NAVA cirrhosis and I have counseled her on the importance of continued healthy diet, control of BP and modest weight loss recommendations. She should also have continued screening for complications of liver disease to include regular screening for [address] and EGD for screening so varices. She has had both of these studies done in recent history. Will perform laboratory testing to monitor liver function and degree of liver injury. This included BMP, hepatic panel, CBC with platelet count, AFP, and INR. Will perform additional serologic tests to screen for other causes of chronic liver disease. Initial extensive work-up was negative. She has recollection of being advised of fatty liver in the past and has some early features of metabolic syndrome. There is a relatively low CAP score <250 on today's Fibroscan. While this could represent burned-out fatty liver, we will run additional lab studies to ensure other etiologies of liver disease are ruled out. The patient has normal liver function.       Screening for Esophageal varices   The patient does not have esophageal or gastric varices on the basis of past 2019 EGD, this should be repeated every 3 years with local GI physician or could be arranged in the future with Dr Sohan Lincoln. Hepatic encephalopathy   Overt HE has not developed to date. There is no need to restrict dietary protein at this time. Significant fatigue  Patient has reported that she has had reduction in energy out of proportion to what she would expect given age and requests additional evaluation. I have drawn TSH, vit D, vit B12, folate studies on labs today. Will forward these findings to her PCP for additional assessment as indicated. Screening for Hepatocellular Carcinoma  [facility] [address] screening was performed in 1/2022 and does not suggest [address] Recent MRI was performed late last week in follow-up to a 5 mm pancreatic lesion and the results of this study are pending at the time of office visit. Will plan on routine AFP evaluation and imaging of the liver every 6 months, will repeat in 7/2022. Treatment of other medical problems in patients with chronic liver disease  There are no contraindications for the patient to take most medications that are necessary for treatment of other medical issues. Counseling for alcohol in patients with chronic liver disease  The patient was counseled regarding alcohol consumption and the effect of alcohol on chronic liver disease. The patient has cirrhosis and was advised to be abstinent from all alcohol including non-alcoholic beer which does contain some alcohol. She drinks several times a week and I have encouraged her to eliminate all alcohol completely. Osteoporosis  The risk of osteoporosis is increased in patients with cirrhosis. DEXA bone density to assess for osteoporosis should be ordered by the patients primary care physician. Vaccinations   The need for vaccination against viral hepatitis A and B will be assessed with serologic and instituted as appropriate.   Routine vaccinations against other bacterial and viral agents can be performed as indicated. Annual flu vaccination should be administered if indicated. ALLERGIES  Not on File    MEDICATIONS  Current Outpatient Medications   Medication Sig    escitalopram oxalate (Lexapro) 10 mg tablet Take 10 mg by mouth daily.  hyoscyamine SL (LEVSIN/SL) 0.125 mg SL tablet 0.125 mg by SubLINGual route every four (4) hours as needed. No current facility-administered medications for this visit. SYSTEM REVIEW NOT RELATED TO LIVER DISEASE OR REVIEWED ABOVE:  Constitution systems: Negative for fever, chills. Slow, steady weight gain of ~25# since menopause. Positive for fatigue. Eyes: Negative for visual changes. ENT: Negative for sore throat, painful swallowing. Respiratory: Negative for cough, hemoptysis, SOB. Cardiology: Negative for chest pain, palpitations. GI:  Negative for constipation or diarrhea. : Negative for urinary frequency, dysuria, hematuria, nocturia. Skin: Negative for rash. Hematology: Negative for easy bruising, blood clots. Musculo-skeletal: Negative for back pain, muscle pain, weakness. Neurologic: Negative for headaches, dizziness, vertigo, memory problems not related to HE. Psychology: Negative for anxiety, depression. FAMILY HISTORY:  The patient has no knowledge of the father's medical condition. The mother has/had the following chronic disease(s): DM, CAD. There is no family history of liver disease apart from 2 first cousins who may have liver disease,?etio. SOCIAL HISTORY:  The patient is . The patient has 2 children and 1 grandchild due in 7/2022. The patient has never used tobacco products. The patient consumes 1-2 alcoholic beverages 1-2 times/week. The patient retired as a  for [email or web address] PHYSICAL EXAMINATION:  Visit Vitals  Ht 5' 5\" (1.651 m)   Wt 192 lb (87.1 kg)   BMI 31.95 kg/m²     General: No acute distress. Eyes: Sclera anicteric. ENT: No oral lesions. Thyroid normal.  Nodes: No adenopathy. Skin: No spider angiomata. No jaundice. No palmar erythema. Respiratory: Lungs clear to auscultation. Cardiovascular: Regular heart rate. No murmurs. No JVD. Abdomen: Soft non-tender. Liver size normal to percussion/palpation. Spleen not palpable. No obvious ascites. Extremities: No edema. No muscle wasting. No gross arthritic changes. Neurologic: Alert and oriented. Cranial nerves grossly intact. No asterixis. LABORATORY STUDIES:  [facility] [place] [facility] [address] Units 2/21/2022   AST 15 - 37 U/L 42 (H)   ALT 12 - 78 U/L 56   Alk Phos 45 - 117 U/L 148 (H)   Bili, Total 0.2 - 1.0 MG/DL 0.7   Bili, Direct 0.0 - 0.2 MG/DL 0.2   Albumin 3.5 - 5.0 g/dL 4.5   From 1/2022  AST/ALT/ALP/T Bili/ALB:35/40/122/0.6/4.5  WBC/HB/PLT/INR: 4.8/14.3/156/1.0    Recent liver function panel, CBC with platelet count and BMP are not available. These studies will be performed. SEROLOGIES:  1/2022. HAV total positive, HBsAntigen negative,anti-HBcore negative, anti-HBsurface positive, anti-HCV negative, Ferritin 98, iron saturation 25%, YOANA negative, ASMA negative, AMA negative, ceruloplsmin 23.6, alpha-1-antitrypsin 133. LIVER HISTOLOGY:  2/2022. Fib[facility]Scan [facility] at [facility]. EkPa was 23.6. Suggested fibrosis level is F4. CAP score is 236. ENDOSCOPIC PROCEDURES:  9/2019. EGD and colonoscopy performed by Dr Eddie Vizcarra.  Erosive esophagitis without varices and polyps, tubular adenoma. RADIOLOGY:  1/2022. CT abdomen. Cirrhotic morphology, no mass. 5 mm pancreatic lesion. perisplenic varices. Right renal sub-cm lesion too small to characterize. 2/2022. MRI abdomen, pending at the time of office visit. OTHER TESTING:  Not available or performed    FOLLOW-UP:  All of the issues listed above in the Assessment and Plan were discussed with the patient. All questions were answered. The patient expressed a clear understanding of the above.     Follow-up [facility] [place] [facility] [place] in 3 months for monitoring of condition. I will contact patient with additional labs when available. Tegan Pulido PA-C  [facility], [address] [address].  [phone]  [address]      2/2022. MRI abdomen. Cirrhotic morphology evident. No liver mass/lesion. Left renal cysts. 5 mm cystic lesion of pancreas head. Recommend one year follow-up of pancreatic lesion.

## 2022-02-21 NOTE — PROGRESS NOTES
Identified pt with two pt identifiers(name and ). Reviewed record in preparation for visit and have obtained necessary documentation. Chief Complaint   Patient presents with    Elevated Liver Enzymes     Establish care      Vitals:    22 1024   Weight: 192 lb (87.1 kg)   Height: 5' 5\" (1.651 m)   PainSc:   0 - No pain       Health Maintenance Review: Patient reminded of \"due or due soon\" health maintenance. I have asked the patient to contact his/her primary care provider (PCP) for follow-up on his/her health maintenance. Coordination of Care Questionnaire:  :   1) Have you been to an emergency room, urgent care, or hospitalized since your last visit? If yes, where when, and reason for visit? no       2. Have seen or consulted any other health care provider since your last visit? If yes, where when, and reason for visit? YES. [facility] (GI) and       Patient is accompanied by self I have received verbal consent from Albino Spindle to discuss any/all medical information while they are present in the room.

## 2022-02-22 LAB
A2 MACROGLOB SERPL-MCNC: 215 MG/DL (ref 110–276)
ACE SERPL-CCNC: 23 U/L (ref 14–82)
AFP L3 MFR SERPL: NORMAL % (ref 0–9.9)
AFP SERPL-MCNC: 2.5 NG/ML (ref 0–8)
ALT SERPL W P-5'-P-CCNC: 49 IU/L (ref 0–40)
APO A-I SERPL-MCNC: 176 MG/DL (ref 116–209)
AST SERPL W P-5'-P-CCNC: 47 IU/L (ref 0–40)
BILIRUB SERPL-MCNC: 0.5 MG/DL (ref 0–1.2)
C-ANCA TITR SER IF: NORMAL TITER
CHOLEST SERPL-MCNC: 259 MG/DL (ref 100–199)
COMMENT:: ABNORMAL
FIBROSIS SCORING:, 550107: ABNORMAL
FIBROSIS STAGE SERPL QL: ABNORMAL
GGT SERPL-CCNC: 52 IU/L (ref 0–60)
GLUCOSE SERPL-MCNC: 92 MG/DL (ref 65–99)
HAPTOGLOB SERPL-MCNC: 143 MG/DL (ref 37–355)
INTERPRETATIONS:, 550143: ABNORMAL
LIVER FIBR SCORE SERPL CALC.FIBROSURE: 0.22 (ref 0–0.21)
LKM-1 AB SER-ACNC: 0.7 UNITS (ref 0–20)
NASH SCORING, 550144: ABNORMAL
NECROINFLAMMATORY ACT GRADE SERPL QL: ABNORMAL
NECROINFLAMMATORY ACT SCORE SERPL: 0.5
P-ANCA ATYPICAL TITR SER IF: NORMAL TITER
P-ANCA TITR SER IF: NORMAL TITER
SERVICE CMNT-IMP: ABNORMAL
STEATOSIS GRADE, 550153: ABNORMAL
STEATOSIS GRADING, 550189: ABNORMAL
STEATOSIS SCORE, 550149: 0.7 (ref 0–0.3)
TRIGL SERPL-MCNC: 204 MG/DL (ref 0–149)

## 2022-02-23 RX ORDER — ERGOCALCIFEROL 1.25 MG/1
50000 CAPSULE ORAL
Qty: 12 CAPSULE | Refills: 0 | Status: SHIPPED | OUTPATIENT
Start: 2022-02-23

## 2022-02-23 NOTE — PROGRESS NOTES
Pt notified of labs, concern for NAVA cirrhosis as indicated by Fibroscan in office. Other etiologies of liver disease negative. Will start vit D 50,000 unit qwk x 12 and forward copy to PCP.

## 2022-06-06 ENCOUNTER — OFFICE VISIT (OUTPATIENT)
Dept: HEMATOLOGY | Age: 62
End: 2022-06-06
Payer: COMMERCIAL

## 2022-06-06 VITALS
HEIGHT: 65 IN | BODY MASS INDEX: 31.72 KG/M2 | HEART RATE: 68 BPM | TEMPERATURE: 97.4 F | OXYGEN SATURATION: 97 % | DIASTOLIC BLOOD PRESSURE: 69 MMHG | SYSTOLIC BLOOD PRESSURE: 121 MMHG | WEIGHT: 190.4 LBS

## 2022-06-06 DIAGNOSIS — K74.60 CIRRHOSIS OF LIVER WITHOUT ASCITES, UNSPECIFIED HEPATIC CIRRHOSIS TYPE (HCC): Primary | ICD-10-CM

## 2022-06-06 PROBLEM — K75.81 LIVER CIRRHOSIS SECONDARY TO NASH (HCC): Status: ACTIVE | Noted: 2022-06-06

## 2022-06-06 PROCEDURE — 99214 OFFICE O/P EST MOD 30 MIN: CPT | Performed by: PHYSICIAN ASSISTANT

## 2022-06-06 RX ORDER — CHOLECALCIFEROL (VITAMIN D3) 50 MCG
CAPSULE ORAL
COMMUNITY

## 2022-06-06 NOTE — PROGRESS NOTES
[address] [facility], Jamison ALVARADO, Basilia Rikijacinta Orozco, [place]      KRISTOPHER Puga, [facility]     Lyric DUNHAM Yoan, [facility]   Svitlana Viera KYLEIGH-MARTHA Brown Miksaeid, [facility]       Ely [facility] [place] [facility]    at [address], [address] Filomena Lucas  22.    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 The active problem list, all pertinent past medical history, medications, endoscopic studies, radiologic findings and laboratory findings related to the liver disorder were reviewed and discussed with the patient. The patient is a 57 y.o.  female who was found to have chronic liver disease and cirrhosis in 1/2022 when she underwent imaging studies to assess long-standing history of abdominal pain. The imaging suggested cirrhotic morphology of the liver. Lab values have shown normal (upper limit) liver enzymes and function, minimal elevation of ALP, borderline low platelet count. An assessment of liver fibrosis with Fibroscan was performed at her initial office visit, this showed EkPa was 23.6. Suggested fibrosis level is F4. CAP score is 236.     Serologic evaluation for markers of chronic liver disease was negative for HCV, HBV, YOANA, ASMA, AMA, a low ceruloplasmin, alpha-1-antitrypsin, an elevation in ferritin, or FE saturation. Additional studies to include ANCA and ALKM were negative and AFP was WNL. The patient has not developed any of the major complications of cirrhosis to date. The patient has the following symptoms which could be attributed to the liver disorder:  profound fatigue, diffuse abdominal pain, and nausea. The patient completes all daily activities without any functional limitations. ASSESSMENT AND PLAN:  Cirrhosis  The diagnosis of cirrhosis is based upon in-office Fibroscan and imaging studies. 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 There was relatively low CAP score on initial Fibroscan. This could represent burned-out fatty liver, she has recollection of being advised of fatty liver in the past and has some early features of metabolic syndrome. The patient has normal liver function. The patient has never developed any complications of cirrhosis to date. Liver transaminases are borderline elevated. ALP is normal.  Liver function is normal.  The platelet count is borderline depressed. Based upon laboratory studies Fibroscan, and imaging, the patient appears to have cirrhosis. I have shared the results of these findings in detail with the patient today. We have briefly outlined the natural hisotry and progression of NAVA cirrhosis and I have counseled her on the importance of continued healthy diet, control of BP and modest weight loss recommendations. She should also have continued screening for complications of liver disease to include regular screening for [address] and EGD for screening so varices. She has had both of these studies done in recent history. I have ordered US for 8/2022 to keep her current.     Will perform laboratory testing to monitor liver function and degree of liver injury. This included BMP, hepatic panel, CBC with platelet count, AFP, and INR. I will forward her information to clinical research team as should would be interested in studies. She understands that this would likely require liver biopsy for entry and is not opposed to this procedure. Screening for Esophageal varices   The patient does not have esophageal or gastric varices on the basis of past 2019 EGD, this should be repeated every 3 years with local GI physician or could be arranged in the future with Dr Nyla Burden. She plans on repeat EGD and colonoscopy with Dr Leatha Chairez in Fall 2022. Hepatic encephalopathy   Overt HE has not developed to date. There is no need to restrict dietary protein at this time. Significant fatigue  Patient has reported that she has had reduction in energy out of proportion to what she would expect given age and requests additional evaluation. I had drawn TSH, vit D, vit B12, folate studies on initial labs and found her to be vit D deficient. She has completed a 12 week course of replacement and I will reassess her response today. Screening for Hepatocellular Carcinoma  [address] screening was performed in 2/2022 and does not suggest [address] Recent MRI was performed late last week in follow-up to a 5 mm pancreatic lesion and this was cystic in appearance. Will plan on routine US every 6 months and repeat MRI in Spring 2023 per radiology recommendation for one year repeat. Treatment of other medical problems in patients with chronic liver disease  There are no contraindications for the patient to take most medications that are necessary for treatment of other medical issues. Counseling for alcohol in patients with chronic liver disease  The patient was counseled regarding alcohol consumption and the effect of alcohol on chronic liver disease.   The patient has cirrhosis and was advised to be abstinent from all alcohol including non-alcoholic beer which does contain some alcohol. She drinks several times a week and I have encouraged her to eliminate all alcohol completely in the setting of cirrhosis. Osteoporosis  The risk of osteoporosis is increased in patients with cirrhosis. DEXA bone density to assess for osteoporosis should be ordered by the patients primary care physician. Vaccinations   The need for vaccination against viral hepatitis A and B will be assessed with serologic and instituted as appropriate. Routine vaccinations against other bacterial and viral agents can be performed as indicated. Annual flu vaccination should be administered if indicated. ALLERGIES  Not on File    MEDICATIONS  Current Outpatient Medications   Medication Sig    B.animalis,bifid,infantis,long (Probiotic 4X) 10-15 mg TbEC Take  by mouth.  escitalopram oxalate (Lexapro) 10 mg tablet Take 10 mg by mouth daily.  hyoscyamine SL (LEVSIN/SL) 0.125 mg SL tablet 0.125 mg by SubLINGual route every four (4) hours as needed.  ergocalciferol (ERGOCALCIFEROL) 1,250 mcg (50,000 unit) capsule Take 1 Capsule by mouth every seven (7) days. (Patient not taking: Reported on 6/6/2022)     No current facility-administered medications for this visit. SYSTEM REVIEW NOT RELATED TO LIVER DISEASE OR REVIEWED ABOVE:  Constitution systems: Negative for fever, chills. Slow, steady weight gain of ~25# since menopause. Positive for fatigue. Eyes: Negative for visual changes. ENT: Negative for sore throat, painful swallowing. Respiratory: Negative for cough, hemoptysis, SOB. Cardiology: Negative for chest pain, palpitations. GI:  Negative for constipation or diarrhea. : Negative for urinary frequency, dysuria, hematuria, nocturia. Skin: Negative for rash. Hematology: Negative for easy bruising, blood clots. Musculo-skeletal: Negative for back pain, muscle pain, weakness.   Neurologic: Negative for headaches, dizziness, vertigo, memory problems not related to HE.  Psychology: Negative for anxiety, depression. FAMILY HISTORY:  The patient has no knowledge of the father's medical condition. The mother has/had the following chronic disease(s): DM, CAD. There is no family history of liver disease apart from 2 first cousins who may have liver disease,?etio. SOCIAL HISTORY:  The patient is . The patient has 2 children and 1 grandchild due in 7/2022. The patient has never used tobacco products. The patient consumes 1-2 alcoholic beverages 1-2 times/week. The patient retired as a  for [employer]. PHYSICAL EXAMINATION:  Visit Vitals  /69 (BP 1 Location: Left upper arm, BP Patient Position: Sitting, BP Cuff Size: Adult long)   Pulse 68   Temp 97.4 °F (36.3 °C) (Temporal)   Ht 5' 5\" (1.651 m)   Wt 190 lb 6.4 oz (86.4 kg)   SpO2 97%   BMI 31.68 kg/m²     General: No acute distress. Eyes: Sclera anicteric. ENT: No oral lesions. Thyroid normal.  Nodes: No adenopathy. Skin: No spider angiomata. No jaundice. No palmar erythema. Respiratory: Lungs clear to auscultation. Cardiovascular: Regular heart rate. No murmurs. No JVD. Abdomen: Soft non-tender. Liver size normal to percussion/palpation. Spleen not palpable. No obvious ascites. Extremities: No edema. No muscle wasting. No gross arthritic changes. Neurologic: Alert and oriented. Cranial nerves grossly intact. No asterixis. LABORATORY STUDIES:  Liver [place] of [id number] Units 2/21/2022   AST 15 - 37 U/L 42 (H)   ALT 12 - 78 U/L 56   Alk Phos 45 - 117 U/L 148 (H)   Bili, Total 0.2 - 1.0 MG/DL 0.7   Bili, Direct 0.0 - 0.2 MG/DL 0.2   Albumin 3.5 - 5.0 g/dL 4.5   From 1/2022  AST/ALT/ALP/T Bili/ALB:35/40/122/0.6/4.5  WBC/HB/PLT/INR: 4.8/14.3/156/1.0    Recent liver function panel, CBC with platelet count and BMP are not available. These studies will be performed. SEROLOGIES:  1/2022.  HAV total positive, HBsAntigen negative,anti-HBcore negative, anti-HBsurface positive, anti-HCV negative, Ferritin 98, iron saturation 25%, YOANA negative, ASMA negative, AMA negative, ceruloplsmin 23.6, alpha-1-antitrypsin 133. LIVER HISTOLOGY:  2/2022. FibroScan performed at [address]. EkPa was 23.6. Suggested fibrosis level is F4. CAP score is 236. ENDOSCOPIC PROCEDURES:  9/2019. EGD and colonoscopy performed by Dr Ajit Han.  Erosive esophagitis without varices and polyps, tubular adenoma. RADIOLOGY:  1/2022. CT abdomen. Cirrhotic morphology, no mass. 5 mm pancreatic lesion. perisplenic varices. Right renal sub-cm lesion too small to characterize. 2/2022. MRI abdomen. Cirrhotic morphology evident. No liver mass/lesion. Left renal cysts. 5 mm cystic lesion of pancreas head. Recommend one year follow-up of pancreatic lesion. OTHER TESTING:  Not available or performed    FOLLOW-UP:  All of the issues listed above in the Assessment and Plan were discussed with the patient. All questions were answered. The patient expressed a clear understanding of the above. [address] [facility] 87 in 4-5 months for monitoring of condition. I will contact patient with additional labs when available. Repeat US in 8/2022.      Rossi Sr PA-C  Liver [facility], [facility] Filomena Rosenthal  22.  [phone]  [address] [facility]

## 2022-06-06 NOTE — PATIENT INSTRUCTIONS

## 2022-06-06 NOTE — PROGRESS NOTES
Identified pt with two pt identifiers(name and ). Reviewed record in preparation for visit and have obtained necessary documentation. Chief Complaint   Patient presents with    Cirrhosis Of Liver     3month f/u with Esparza      Vitals:    22 1112   BP: 121/69   Pulse: 68   Temp: 97.4 °F (36.3 °C)   TempSrc: Temporal   SpO2: 97%   Weight: 190 lb 6.4 oz (86.4 kg)   Height: 5' 5\" (1.651 m)   PainSc:   0 - No pain       Health Maintenance Review: Patient reminded of \"due or due soon\" health maintenance. I have asked the patient to contact his/her primary care provider (PCP) for follow-up on his/her health maintenance. Coordination of Care Questionnaire:  :   1) Have you been to an emergency room, urgent care, or hospitalized since your last visit? If yes, where when, and reason for visit? no       2. Have seen or consulted any other health care provider since your last visit? If yes, where when, and reason for visit? NO      Patient is accompanied by self I have received verbal consent from Mary Ellen Gonzalez to discuss any/all medical information while they are present in the room.

## 2022-06-08 LAB
25(OH)D3+25(OH)D2 SERPL-MCNC: 32.9 NG/ML (ref 30–100)
AFP L3 MFR SERPL: NORMAL % (ref 0–9.9)
AFP SERPL-MCNC: 2.4 NG/ML (ref 0–9.2)
ALBUMIN SERPL-MCNC: 5 G/DL (ref 3.8–4.8)
ALP SERPL-CCNC: 136 IU/L (ref 44–121)
ALT SERPL-CCNC: 39 IU/L (ref 0–32)
AST SERPL-CCNC: 34 IU/L (ref 0–40)
BILIRUB DIRECT SERPL-MCNC: 0.14 MG/DL (ref 0–0.4)
BILIRUB SERPL-MCNC: 0.5 MG/DL (ref 0–1.2)
BUN SERPL-MCNC: 10 MG/DL (ref 8–27)
BUN/CREAT SERPL: 14 (ref 12–28)
CALCIUM SERPL-MCNC: 9.8 MG/DL (ref 8.7–10.3)
CHLORIDE SERPL-SCNC: 102 MMOL/L (ref 96–106)
CO2 SERPL-SCNC: 24 MMOL/L (ref 20–29)
CREAT SERPL-MCNC: 0.74 MG/DL (ref 0.57–1)
EGFR: 91 ML/MIN/1.73
ERYTHROCYTE [DISTWIDTH] IN BLOOD BY AUTOMATED COUNT: 14 % (ref 11.7–15.4)
GLUCOSE SERPL-MCNC: 90 MG/DL (ref 65–99)
HCT VFR BLD AUTO: 44 % (ref 34–46.6)
HGB BLD-MCNC: 14.4 G/DL (ref 11.1–15.9)
INR PPP: 1 (ref 0.9–1.2)
MCH RBC QN AUTO: 28.8 PG (ref 26.6–33)
MCHC RBC AUTO-ENTMCNC: 32.7 G/DL (ref 31.5–35.7)
MCV RBC AUTO: 88 FL (ref 79–97)
PLATELET # BLD AUTO: 172 X10E3/UL (ref 150–450)
POTASSIUM SERPL-SCNC: 4.4 MMOL/L (ref 3.5–5.2)
PROT SERPL-MCNC: 7.8 G/DL (ref 6–8.5)
PROTHROMBIN TIME: 10.7 SEC (ref 9.1–12)
RBC # BLD AUTO: 5 X10E6/UL (ref 3.77–5.28)
SODIUM SERPL-SCNC: 141 MMOL/L (ref 134–144)
WBC # BLD AUTO: 5.3 X10E3/UL (ref 3.4–10.8)

## 2022-11-17 ENCOUNTER — TRANSCRIBE ORDER (OUTPATIENT)
Dept: SCHEDULING | Age: 62
End: 2022-11-17

## 2022-11-17 DIAGNOSIS — Z12.31 VISIT FOR SCREENING MAMMOGRAM: Primary | ICD-10-CM

## 2023-01-09 ENCOUNTER — HOSPITAL ENCOUNTER (OUTPATIENT)
Dept: MAMMOGRAPHY | Age: 63
Discharge: HOME | End: 2023-01-09
Attending: SPECIALIST
Payer: COMMERCIAL

## 2023-01-09 DIAGNOSIS — Z12.31 VISIT FOR SCREENING MAMMOGRAM: ICD-10-CM

## 2023-01-09 PROCEDURE — 77063 BREAST TOMOSYNTHESIS BI: CPT

## 2023-03-15 ENCOUNTER — OFFICE VISIT (OUTPATIENT)
Dept: HEMATOLOGY | Age: 63
End: 2023-03-15
Payer: COMMERCIAL

## 2023-03-15 VITALS
WEIGHT: 193 LBS | BODY MASS INDEX: 32.15 KG/M2 | SYSTOLIC BLOOD PRESSURE: 129 MMHG | HEART RATE: 74 BPM | HEIGHT: 65 IN | OXYGEN SATURATION: 97 % | DIASTOLIC BLOOD PRESSURE: 75 MMHG | TEMPERATURE: 97.8 F

## 2023-03-15 DIAGNOSIS — K74.60 CIRRHOSIS OF LIVER WITHOUT ASCITES, UNSPECIFIED HEPATIC CIRRHOSIS TYPE (HCC): Primary | ICD-10-CM

## 2023-03-15 DIAGNOSIS — R79.89 LOW VITAMIN D LEVEL: ICD-10-CM

## 2023-03-15 PROCEDURE — 99214 OFFICE O/P EST MOD 30 MIN: CPT | Performed by: PHYSICIAN ASSISTANT

## 2023-03-15 RX ORDER — CHOLECALCIFEROL TAB 125 MCG (5000 UNIT) 125 MCG
5000 TAB ORAL DAILY
COMMUNITY

## 2023-03-15 RX ORDER — ACETAMINOPHEN 500 MG
TABLET ORAL
COMMUNITY

## 2023-03-15 NOTE — PROGRESS NOTES
[facility], MD, [address], [place], [place]      KRISTOPHER Portillo, [facility]   Fer Goodman, [facility]-   Yaniv Juarez, FNP-MARTHA Correa, FNP-C   Sherita Lau, PCNP-BC      [address]   at [address], [address]   [phone]   FAX: [address]   at [address], [address] [place] [address], [facility] [address]   [phone]   FAX: [phone]       Patient Care Team:  Shania Eng DO as PCP - General (Family Medicine)  Shania Eng DO (Family Medicine)  Aurelia Mcgee MD (Gastroenterology)  Jamie Toledo, PhD, RD, CNSC (Nutrition)    Problem List  Date Reviewed: 6/6/2022            Codes Class Noted    Liver cirrhosis secondary to NAVA 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 The active problem list, all pertinent past medical history, medications, endoscopic studies, radiologic findings and laboratory findings related to the liver disorder were reviewed and discussed with the patient. This is her first return office visit in 9 months. The patient is a 60 y.o.  female who was found to have chronic liver disease and cirrhosis in 1/2022 when she underwent imaging studies to assess long-standing history of abdominal pain. The imaging suggested cirrhotic morphology of the liver. Lab values have shown normal (upper limit) liver enzymes and function, minimal elevation of ALP, borderline low platelet count. An assessment of liver fibrosis with Fibroscan was performed at her initial office visit, this showed EkPa was 23.6.   Suggested fibrosis level is F4.  CAP score is 236. Serologic evaluation for markers of chronic liver disease was negative for HCV, HBV, YOANA, ASMA, AMA, a low ceruloplasmin, alpha-1-antitrypsin, an elevation in ferritin, or FE saturation. Additional studies to include ANCA and ALKM were negative and AFP was WNL. The patient has not developed any of the major complications of cirrhosis to date. The patient has the following symptoms which could be attributed to the liver disorder:  profound fatigue, diffuse abdominal pain, and nausea. The patient completes all daily activities without any functional limitations. She is scheduled for repeat EGD/Colon in a few weeks for repeat evaluation with Dr Junior Rocha.     She has had recent MRI for evaluation of pancreatic cyst in 2/2023 and this has been stable, 5 mm IPMN. She is seeing a nutritionist now and is on low FODMAP diet and is finding that this is helpful to her with eliminating abdominal pain and bloating. She has been placed on probiotics as well. She states that she does not have a PCP that she has been comfortable with. ASSESSMENT AND PLAN:  Cirrhosis  The diagnosis of cirrhosis is based upon in-office Fibroscan and imaging studies. 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 There was relatively low CAP score on initial Fibroscan. This could represent burned-out fatty liver, she has recollection of being advised of fatty liver in the past and has some early features of metabolic syndrome. The patient has normal liver function. We will repeat labs today as she has not had any drawn within the past 6 months. The patient has never developed any complications of cirrhosis to date. Reviewed past labs, liver transaminases are borderline elevated. ALP is normal.  Liver function is normal.  The platelet count is borderline depressed.    These values are in need of updating and I have repeated today. Based upon laboratory studies Fibroscan, and imaging, the patient appears to have cirrhosis. I have shared the results of these findings in detail with the patient today. We have briefly outlined the natural history and progression of NAVA cirrhosis and I have counseled her on the importance of continued healthy diet, control of BP and modest weight loss recommendations. She should also have continued screening for complications of liver disease to include regular screening for [address] and EGD for screening so varices. I have stressed the importance of US for [address] screening every 6 months and will repeat this in Fall 2023. Will obtain AFP in the office today. I have reviewed with her the results of the MRI done last month and there was no concern for liver mass/lesion. Will perform laboratory testing to monitor liver function and degree of liver injury. This included BMP, hepatic panel, CBC with platelet count, AFP, and INR. I will forward her information to clinical research team as should would be interested in studies. She understands that this would likely require liver biopsy for entry and is not opposed to this procedure. Screening for Esophageal varices   The patient does not have esophageal or gastric varices on the basis of past 2019 EGD, this should be repeated every 3 years with local GI physician or could be arranged in the future with Dr Farrah Rodriguez. She plans on repeat EGD and colonoscopy with Dr Anthony Angel in the next several weeks. Hepatic encephalopathy   Overt HE has not developed to date. There is no need to restrict dietary protein at this time. Significant fatigue  Patient has reported that she has had reduction in energy out of proportion to what she would expect given age and requests additional evaluation. I will repeat a vit D on labs today as this had been low in the past and responsive to repletion.      Screening for Hepatocellular Carcinoma  HCC screening was performed in 2/2022 and does not suggest [address] Last MRI was performed 2/2023 in follow-up to a 5 mm pancreatic lesion and this was cystic in appearance, consistent with unchanged IPMN. I have drawn AFP in office today and will repeat [address] in 9/2023 prior to the next office visit. Treatment of other medical problems in patients with chronic liver disease  There are no contraindications for the patient to take most medications that are necessary for treatment of other medical issues. I have encouraged her continued careful elimination diet and nutrition consultation and suggested that she consider allergy testing as well. I have also given her the name of PCP office in her area to try to establish with new PCP as she has no one doing routine screening and medical care. Counseling for alcohol in patients with chronic liver disease  The patient was counseled regarding alcohol consumption and the effect of alcohol on chronic liver disease. The patient has cirrhosis and was advised to be abstinent from all alcohol including non-alcoholic beer which does contain some alcohol. She drinks several times a week and I have encouraged her to eliminate all alcohol completely in the setting of cirrhosis. Osteoporosis  The risk of osteoporosis is increased in patients with cirrhosis. DEXA bone density to assess for osteoporosis should be ordered by the patients primary care physician. Vaccinations   The need for vaccination against viral hepatitis A and B will be assessed with serologic and instituted as appropriate. Routine vaccinations against other bacterial and viral agents can be performed as indicated. Annual flu vaccination should be administered if indicated.     ALLERGIES  No Known Allergies    MEDICATIONS  Current Outpatient Medications   Medication Sig    Lactobacillus acidophilus (Probiotic) 10 billion cell cap Probiotic    cholecalciferol (VITAMIN D3) (5000 Units/125 mcg) tab tablet Take 5,000 Units by mouth daily. B.animalis,bifid,infantis,long (Probiotic 4X) 10-15 mg TbEC Take  by mouth.    escitalopram oxalate (LEXAPRO) 10 mg tablet Take 10 mg by mouth daily. hyoscyamine SL (LEVSIN/SL) 0.125 mg SL tablet 0.125 mg by SubLINGual route every four (4) hours as needed. escitalopram oxalate (LEXAPRO) 10 mg tablet Take 10 mg by mouth daily. albuterol (PROAIR HFA) 90 mcg/actuation inhaler Take 2 Puffs by inhalation every four (4) hours as needed for Wheezing.    ergocalciferol (ERGOCALCIFEROL) 1,250 mcg (50,000 unit) capsule Take 1 Capsule by mouth every seven (7) days. (Patient not taking: No sig reported)     No current facility-administered medications for this visit. SYSTEM REVIEW NOT RELATED TO LIVER DISEASE OR REVIEWED ABOVE:  Constitution systems: Negative for fever, chills. Slow, steady weight gain of ~25# since menopause. Positive for fatigue. Eyes: Negative for visual changes. ENT: Negative for sore throat, painful swallowing. Respiratory: Negative for cough, hemoptysis, SOB. Cardiology: Negative for chest pain, palpitations. GI:  Negative for constipation or diarrhea. : Negative for urinary frequency, dysuria, hematuria, nocturia. Skin: Negative for rash. Hematology: Negative for easy bruising, blood clots. Musculo-skeletal: Negative for back pain, muscle pain, weakness. Neurologic: Negative for headaches, dizziness, vertigo, memory problems not related to HE. Psychology: Negative for anxiety, depression. FAMILY HISTORY:  The patient has no knowledge of the father's medical condition. The mother has/had the following chronic disease(s): DM, CAD. There is no family history of liver disease apart from 2 first cousins who may have liver disease,?etio. SOCIAL HISTORY:  The patient is . The patient has 2 children and 1 grandchild due in 7/2022. The patient has never used tobacco products. The patient consumes 1-2 alcoholic beverages 1-2 times/week. The patient retired as a  for [employer]. PHYSICAL EXAMINATION:  Visit Vitals  /75 (BP 1 Location: Right arm, BP Patient Position: Sitting, BP Cuff Size: Adult long)   Pulse 74   Temp 97.8 °F (36.6 °C) (Temporal)   Ht 5' 5\" (1.651 m)   Wt 193 lb (87.5 kg)   SpO2 97%   BMI 32.12 kg/m²     General: No acute distress. Eyes: Sclera anicteric. ENT: No oral lesions. Thyroid normal.  Nodes: No adenopathy. Skin: No spider angiomata. No jaundice. No palmar erythema. Respiratory: Lungs clear to auscultation. Cardiovascular: Regular heart rate. No murmurs. No JVD. Abdomen: Soft non-tender. Liver size normal to percussion/palpation. Spleen not palpable. No obvious ascites. Extremities: No edema. No muscle wasting. No gross arthritic changes. Neurologic: Alert and oriented. Cranial nerves grossly intact. No asterixis. LABORATORY STUDIES:  Liver [place] of [address] Units 6/6/2022 2/21/2022 2/21/2022   WBC 3.4 - 10.8 x10E3/uL 5.3     ANC 1.8 - 8.0 K/UL      HGB 11.1 - 15.9 g/dL 14.4      - 450 x10E3/uL 172     INR 0.9 - 1.2 1.0     AST 0 - 40 IU/L 34 47 (H) 42 (H)   ALT 0 - 32 IU/L 39 (H) 49 (H) 56   Alk Phos 44 - 121 IU/L 136 (H)  148 (H)   Bili, Total 0.0 - 1.2 mg/dL 0.5 0.5 0.7   Bili, Direct 0.00 - 0.40 mg/dL 0.14  0.2   Albumin 3.8 - 4.8 g/dL 5.0 (H)  4.5   BUN 8 - 27 mg/dL 10     Creat 0.57 - 1.00 mg/dL 0.74     Na 134 - 144 mmol/L 141     K 3.5 - 5.2 mmol/L 4.4     Cl 96 - 106 mmol/L 102     CO2 20 - 29 mmol/L 24     Glucose 65 - 99 mg/dL 90 92    Magnesium 1.6 - 2.4 mg/dL        Cancer Screening Latest Ref Rng & Units 6/6/2022 2/21/2022   AFP, Serum 0.0 - 9.2 ng/mL 2.4 2.5   AFP-L3% 0.0 - 9.9 % Comment Comment   Additional lab values drawn at today's office visit are pending at the time of documentation. SEROLOGIES:  1/2022.  HAV total positive, HBsAntigen negative,anti-HBcore negative, anti-HBsurface positive, anti-HCV negative, Ferritin 98, iron saturation 25%, YOANA negative, ASMA negative, AMA negative, ceruloplasmin 23.6, alpha-1-antitrypsin 133. LIVER HISTOLOGY:  2/2022. FibroScan performed at [facility]. EkPa was 23.6. Suggested fibrosis level is F4. CAP score is 236. ENDOSCOPIC PROCEDURES:  9/2019. EGD and colonoscopy performed by Dr Alpesh August.  Erosive esophagitis without varices and polyps, tubular adenoma. RADIOLOGY:  1/2022. CT abdomen. Cirrhotic morphology, no mass. 5 mm pancreatic lesion. perisplenic varices. Right renal sub-cm lesion too small to characterize. 2/2022. MRI abdomen. Cirrhotic morphology evident. No liver mass/lesion. Left renal cysts. 5 mm cystic lesion of pancreas head. Recommend one year follow-up of pancreatic lesion. 2/2023. MRI abdomen. Cirrhosis morphology of liver. No liver mass/lesion. Stable 5 mm nonenhancing pancreatic cyst, IPMN. OTHER TESTING:  Not available or performed    FOLLOW-UP:  All of the issues listed above in the Assessment and Plan were discussed with the patient. All questions were answered. The patient expressed a clear understanding of the above. [facility] in 6 months for monitoring of condition. I will contact patient with additional labs when available. Repeat US in 9/2023 and EGD/Colonoscopy with local GI in the near future.      Valentina Epps PA-C  Liver [facility] [address] [facility] Center[facility]una [address]  [address].  [phone]  [address] [facility]

## 2023-03-16 LAB
25(OH)D3+25(OH)D2 SERPL-MCNC: 33.1 NG/ML (ref 30–100)
AFP L3 MFR SERPL: NORMAL % (ref 0–9.9)
AFP SERPL-MCNC: 2.5 NG/ML (ref 0–9.2)
ALBUMIN SERPL-MCNC: 4.6 G/DL (ref 3.8–4.8)
ALP SERPL-CCNC: 114 IU/L (ref 44–121)
ALT SERPL-CCNC: 42 IU/L (ref 0–32)
AST SERPL-CCNC: 39 IU/L (ref 0–40)
BILIRUB DIRECT SERPL-MCNC: <0.1 MG/DL (ref 0–0.4)
BILIRUB SERPL-MCNC: 0.3 MG/DL (ref 0–1.2)
BUN SERPL-MCNC: 10 MG/DL (ref 8–27)
BUN/CREAT SERPL: 14 (ref 12–28)
CALCIUM SERPL-MCNC: 10.1 MG/DL (ref 8.7–10.3)
CHLORIDE SERPL-SCNC: 99 MMOL/L (ref 96–106)
CHOLEST SERPL-MCNC: 215 MG/DL (ref 100–199)
CO2 SERPL-SCNC: 24 MMOL/L (ref 20–29)
CREAT SERPL-MCNC: 0.69 MG/DL (ref 0.57–1)
EGFRCR SERPLBLD CKD-EPI 2021: 97 ML/MIN/1.73
ERYTHROCYTE [DISTWIDTH] IN BLOOD BY AUTOMATED COUNT: 13.6 % (ref 11.7–15.4)
GLUCOSE SERPL-MCNC: 122 MG/DL (ref 70–99)
HCT VFR BLD AUTO: 40.5 % (ref 34–46.6)
HDLC SERPL-MCNC: 55 MG/DL
HGB BLD-MCNC: 14 G/DL (ref 11.1–15.9)
INR PPP: 1 (ref 0.9–1.2)
LDLC SERPL CALC-MCNC: 120 MG/DL (ref 0–99)
MCH RBC QN AUTO: 29.4 PG (ref 26.6–33)
MCHC RBC AUTO-ENTMCNC: 34.6 G/DL (ref 31.5–35.7)
MCV RBC AUTO: 85 FL (ref 79–97)
PLATELET # BLD AUTO: 162 X10E3/UL (ref 150–450)
POTASSIUM SERPL-SCNC: 3.9 MMOL/L (ref 3.5–5.2)
PROT SERPL-MCNC: 7.4 G/DL (ref 6–8.5)
PROTHROMBIN TIME: 10.3 SEC (ref 9.1–12)
RBC # BLD AUTO: 4.77 X10E6/UL (ref 3.77–5.28)
SODIUM SERPL-SCNC: 139 MMOL/L (ref 134–144)
TRIGL SERPL-MCNC: 229 MG/DL (ref 0–149)
VLDLC SERPL CALC-MCNC: 40 MG/DL (ref 5–40)
WBC # BLD AUTO: 4.8 X10E3/UL (ref 3.4–10.8)

## 2023-04-23 DIAGNOSIS — K74.60 CIRRHOSIS OF LIVER WITHOUT ASCITES, UNSPECIFIED HEPATIC CIRRHOSIS TYPE (HCC): Primary | ICD-10-CM

## 2023-08-22 ENCOUNTER — OFFICE VISIT (OUTPATIENT)
Dept: PRIMARY CARE CLINIC | Facility: CLINIC | Age: 63
End: 2023-08-22
Payer: COMMERCIAL

## 2023-08-22 VITALS
SYSTOLIC BLOOD PRESSURE: 121 MMHG | HEART RATE: 64 BPM | DIASTOLIC BLOOD PRESSURE: 79 MMHG | BODY MASS INDEX: 32.15 KG/M2 | RESPIRATION RATE: 18 BRPM | HEIGHT: 65 IN | WEIGHT: 193 LBS | OXYGEN SATURATION: 100 %

## 2023-08-22 DIAGNOSIS — R73.09 ELEVATED GLUCOSE: ICD-10-CM

## 2023-08-22 DIAGNOSIS — K75.81 NASH (NONALCOHOLIC STEATOHEPATITIS): ICD-10-CM

## 2023-08-22 DIAGNOSIS — K74.60 CIRRHOSIS OF LIVER WITHOUT ASCITES, UNSPECIFIED HEPATIC CIRRHOSIS TYPE (HCC): Primary | ICD-10-CM

## 2023-08-22 DIAGNOSIS — K44.9 HIATAL HERNIA: ICD-10-CM

## 2023-08-22 DIAGNOSIS — K58.0 IRRITABLE BOWEL SYNDROME WITH DIARRHEA: ICD-10-CM

## 2023-08-22 DIAGNOSIS — K60.2 ANAL FISSURE: ICD-10-CM

## 2023-08-22 DIAGNOSIS — K86.9 PANCREATIC LESION: ICD-10-CM

## 2023-08-22 DIAGNOSIS — E78.5 HYPERLIPIDEMIA, UNSPECIFIED HYPERLIPIDEMIA TYPE: ICD-10-CM

## 2023-08-22 DIAGNOSIS — K74.60 CIRRHOSIS OF LIVER WITHOUT ASCITES, UNSPECIFIED HEPATIC CIRRHOSIS TYPE (HCC): ICD-10-CM

## 2023-08-22 DIAGNOSIS — F41.9 ANXIETY: ICD-10-CM

## 2023-08-22 PROCEDURE — 99204 OFFICE O/P NEW MOD 45 MIN: CPT | Performed by: FAMILY MEDICINE

## 2023-08-22 ASSESSMENT — ENCOUNTER SYMPTOMS: DIARRHEA: 1

## 2023-08-22 NOTE — PROGRESS NOTES
HPI     Chief Complaint   Patient presents with    New Patient     She is a 60 y.o. female who presents for establishing care. PMH is notable for:    - TREVINO/ NAFLD, diagnosed with this in Feb 2022, she is followed by [address] [place] [address]. Has liver cirrhosis. - Hiatal hernia, managing this symptomatically. - Gastric ulcer, diagnosed in May 2023. Was diagnosed during an endoscopy. Was given Pantoprazole. Took this for a while but hasn't taken it this summer. Does have a lot of reflux. Not sure if she had H Pylori. Had endoscopy with Dr. Kevin Kidd with [facility]. - Colonic diverticula, has not had any episodes of diverticulitis. - Anal fissure, has not resolved. She is seeing Dr. Edvin Hare for this. She is having a hemorrhoidectomy and possible ansocopy in 2 weeks. - Lesion in pancreas 5 mm on CT scan in Jan 2022. Had another MRI in Feb 2023 that showed stable pancreatic lesion and based on size recommended repeating an MRI every year for 5 years. Has not had any weight loss. No appetite issues. Has been doing low fodmap diet. - IBS more diarrhea, but sometimes has constipation. Has not tried any medications at this time. Has been checked for celiacs. - On Lexapro for post menopause. Prescribed by Dr. Dolores Youngblood her GYN for some anxiety. Mood is stable on it. Denies SI/ HI.     - HLD - Not on meds. The 10-year ASCVD risk score (Ghassan DK, et al., 2019) is: 4.2%    Values used to calculate the score:      Age: 60 years      Sex: Female      Is Non- : No      Diabetic: No      Tobacco smoker: No      Systolic Blood Pressure: 159 mmHg      Is BP treated: No      HDL Cholesterol: 55 mg/dL      Total Cholesterol: 215 mg/dL     Review of Systems   Constitutional:  Negative for chills and fever. Gastrointestinal:  Positive for diarrhea. Psychiatric/Behavioral:  Negative for self-injury and suicidal ideas.        Reviewed PmHx,

## 2023-08-26 LAB
ALBUMIN SERPL-MCNC: 4.4 G/DL (ref 3.9–4.9)
ALBUMIN/GLOB SERPL: 1.5 {RATIO} (ref 1.2–2.2)
ALP SERPL-CCNC: 98 IU/L (ref 44–121)
ALT SERPL-CCNC: 31 IU/L (ref 0–32)
AST SERPL-CCNC: 33 IU/L (ref 0–40)
BILIRUB SERPL-MCNC: 0.4 MG/DL (ref 0–1.2)
BUN SERPL-MCNC: 14 MG/DL (ref 8–27)
BUN/CREAT SERPL: 19 (ref 12–28)
CALCIUM SERPL-MCNC: 9.1 MG/DL (ref 8.7–10.3)
CHLORIDE SERPL-SCNC: 104 MMOL/L (ref 96–106)
CO2 SERPL-SCNC: 24 MMOL/L (ref 20–29)
CREAT SERPL-MCNC: 0.74 MG/DL (ref 0.57–1)
EGFRCR SERPLBLD CKD-EPI 2021: 91 ML/MIN/1.73
ERYTHROCYTE [DISTWIDTH] IN BLOOD BY AUTOMATED COUNT: 13.5 % (ref 11.7–15.4)
GLOBULIN SER CALC-MCNC: 2.9 G/DL (ref 1.5–4.5)
GLUCOSE SERPL-MCNC: 89 MG/DL (ref 70–99)
HBA1C MFR BLD: 5.5 % (ref 4.8–5.6)
HCT VFR BLD AUTO: 39.7 % (ref 34–46.6)
HGB BLD-MCNC: 13.5 G/DL (ref 11.1–15.9)
MCH RBC QN AUTO: 29.2 PG (ref 26.6–33)
MCHC RBC AUTO-ENTMCNC: 34 G/DL (ref 31.5–35.7)
MCV RBC AUTO: 86 FL (ref 79–97)
PLATELET # BLD AUTO: 152 X10E3/UL (ref 150–450)
POTASSIUM SERPL-SCNC: 4.1 MMOL/L (ref 3.5–5.2)
PROT SERPL-MCNC: 7.3 G/DL (ref 6–8.5)
RBC # BLD AUTO: 4.62 X10E6/UL (ref 3.77–5.28)
SODIUM SERPL-SCNC: 143 MMOL/L (ref 134–144)
WBC # BLD AUTO: 4.5 X10E3/UL (ref 3.4–10.8)

## 2024-01-02 NOTE — PROGRESS NOTES
Called and left detailed message for patient re stable labs and improved lipid panel. Will follow-up on AFP when available to me. Proceed with US as ordered now and maintain follow-up in 6 months. 19.1

## 2024-01-15 ENCOUNTER — TRANSCRIBE ORDERS (OUTPATIENT)
Facility: HOSPITAL | Age: 64
End: 2024-01-15

## 2024-01-15 DIAGNOSIS — Z12.31 SCREENING MAMMOGRAM FOR BREAST CANCER: Primary | ICD-10-CM

## 2024-01-17 ENCOUNTER — HOSPITAL ENCOUNTER (OUTPATIENT)
Facility: HOSPITAL | Age: 64
Discharge: HOME | End: 2024-01-20
Attending: SPECIALIST
Payer: COMMERCIAL

## 2024-01-17 DIAGNOSIS — Z12.31 SCREENING MAMMOGRAM FOR BREAST CANCER: ICD-10-CM

## 2024-01-17 PROCEDURE — 77063 BREAST TOMOSYNTHESIS BI: CPT

## 2024-02-14 PROBLEM — H52.00 HYPERMETROPIA: Status: ACTIVE | Noted: 2019-11-06

## 2024-02-14 PROBLEM — M31.6 GIANT CELL ARTERITIS (HCC): Status: ACTIVE | Noted: 2017-05-15

## 2024-02-14 PROBLEM — H52.4 PRESBYOPIA: Status: ACTIVE | Noted: 2019-11-06

## 2024-02-14 PROBLEM — M35.00 SJOGREN'S SYNDROME (HCC): Status: ACTIVE | Noted: 2018-07-24

## 2024-02-14 PROBLEM — D23.9: Status: ACTIVE | Noted: 2019-10-28

## 2024-02-14 PROBLEM — E55.9 VITAMIN D DEFICIENCY: Status: ACTIVE | Noted: 2019-07-10

## 2024-02-14 PROBLEM — D25.9 UTERINE LEIOMYOMA: Status: ACTIVE | Noted: 2024-02-14

## 2024-02-14 PROBLEM — E78.00 HYPERCHOLESTEROLEMIA: Status: ACTIVE | Noted: 2024-02-14

## 2024-07-31 ENCOUNTER — OFFICE VISIT (OUTPATIENT)
Age: 64
End: 2024-07-31
Payer: COMMERCIAL

## 2024-07-31 VITALS
DIASTOLIC BLOOD PRESSURE: 79 MMHG | WEIGHT: 179 LBS | RESPIRATION RATE: 16 BRPM | BODY MASS INDEX: 29.82 KG/M2 | SYSTOLIC BLOOD PRESSURE: 124 MMHG | HEIGHT: 65 IN | OXYGEN SATURATION: 98 % | HEART RATE: 74 BPM | TEMPERATURE: 97.3 F

## 2024-07-31 DIAGNOSIS — K74.60 CIRRHOSIS OF LIVER WITHOUT ASCITES, UNSPECIFIED HEPATIC CIRRHOSIS TYPE (HCC): Primary | ICD-10-CM

## 2024-07-31 PROCEDURE — 99214 OFFICE O/P EST MOD 30 MIN: CPT | Performed by: PHYSICIAN ASSISTANT

## 2024-07-31 RX ORDER — TRIAMCINOLONE ACETONIDE 1 MG/G
CREAM TOPICAL
COMMUNITY
Start: 2024-07-10

## 2024-07-31 ASSESSMENT — PATIENT HEALTH QUESTIONNAIRE - PHQ9
2. FEELING DOWN, DEPRESSED OR HOPELESS: NOT AT ALL
DEPRESSION UNABLE TO ASSESS: FUNCTIONAL CAPACITY MOTIVATION LIMITS ACCURACY
SUM OF ALL RESPONSES TO PHQ QUESTIONS 1-9: 0
1. LITTLE INTEREST OR PLEASURE IN DOING THINGS: NOT AT ALL
SUM OF ALL RESPONSES TO PHQ QUESTIONS 1-9: 0
SUM OF ALL RESPONSES TO PHQ9 QUESTIONS 1 & 2: 0

## 2024-07-31 NOTE — PROGRESS NOTES
Identified pt with two pt identifiers(name and ). Reviewed record in preparation for visit and have obtained necessary documentation.  Vitals:    24 1112   BP: 124/79   Site: Left Upper Arm   Position: Sitting   Cuff Size: Medium Adult   Pulse: 74   Resp: 16   Temp: 97.3 °F (36.3 °C)   TempSrc: Temporal   SpO2: 98%   Weight: 81.2 kg (179 lb)   Height: 1.651 m (5' 5\")        Health Maintenance Review: Patient reminded of \"due or due soon\" health maintenance. I have asked the patient to contact his/her primary care provider (PCP) for follow-up on his/her health maintenance.    Coordination of Care Questionnaire:  :   1) Have you been to an emergency room, urgent care, or hospitalized since your last visit?  If yes, where when, and reason for visit? no       2. Have seen or consulted any other health care provider since your last visit?   If yes, where when, and reason for visit?  No      Patient is accompanied by self I have received verbal consent from Polly Gaviria to discuss any/all medical information while they are present in the room.

## 2024-07-31 NOTE — PROGRESS NOTES
[facility]      Tee Grant MD, FACP, FACG, FAASLD      AZAR Burgos-ROBIN Hernandez, [facility]   Saranya Balderrama, [facility]   Neda Waqas, [facility]  Edwin Harvey, [facility]   Arelis Morgan, [facility]   Elaine Luiz, [facility]   [address], [address]   [place]  [address] [phone]   FAX: [phone]  [facility]   [address], [address]   [place]  [address] [phone]   FAX: [phone]     Patient Care Team:  Lu Navas DO as PCP - General (Family Medicine)  Lu Navas DO as PCP - Empaneled Provider  Bobbi Beth DO    Patient Active Problem List   Diagnosis    Liver cirrhosis secondary to TREVINO (HCC)    Giant cell arteritis (HCC)    Hypercholesterolemia    Hypermetropia    Presbyopia    Sebaceoma    Sjogren's syndrome (HCC)    Uterine leiomyoma    Vitamin D deficiency     Polly Gaviria is being seen at [facility] for management of 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 The active problem list, all pertinent past medical history, medications, endoscopic studies, radiologic findings and laboratory findings related to the liver disorder were reviewed and discussed with the patient.      This is her first return office visit in 18 months. She was reminded to maintain follow-up by her GI physician, Dr Mcghee.     The patient is a 63 y.o.  female who was found to have chronic liver disease and cirrhosis in 1/2022 when she underwent imaging studies to assess long-standing history of abdominal pain. The imaging suggested cirrhotic morphology of the liver.     Lab values have shown normal (upper limit) liver enzymes and function, minimal elevation of ALP, borderline low platelet count.      An

## 2024-08-01 LAB
ALBUMIN SERPL-MCNC: 4.5 G/DL (ref 3.9–4.9)
ALP SERPL-CCNC: 84 IU/L (ref 44–121)
ALT SERPL-CCNC: 34 IU/L (ref 0–32)
AST SERPL-CCNC: 26 IU/L (ref 0–40)
BILIRUB DIRECT SERPL-MCNC: 0.19 MG/DL (ref 0–0.4)
BILIRUB SERPL-MCNC: 0.7 MG/DL (ref 0–1.2)
ERYTHROCYTE [DISTWIDTH] IN BLOOD BY AUTOMATED COUNT: 15 % (ref 11.7–15.4)
HCT VFR BLD AUTO: 44.3 % (ref 34–46.6)
HGB BLD-MCNC: 14.8 G/DL (ref 11.1–15.9)
MCH RBC QN AUTO: 29.4 PG (ref 26.6–33)
MCHC RBC AUTO-ENTMCNC: 33.4 G/DL (ref 31.5–35.7)
MCV RBC AUTO: 88 FL (ref 79–97)
PLATELET # BLD AUTO: 186 X10E3/UL (ref 150–450)
PROT SERPL-MCNC: 7.2 G/DL (ref 6–8.5)
RBC # BLD AUTO: 5.03 X10E6/UL (ref 3.77–5.28)
WBC # BLD AUTO: 6 X10E3/UL (ref 3.4–10.8)

## 2024-08-02 LAB
AFP L3 MFR SERPL: NORMAL % (ref 0–9.9)
AFP SERPL-MCNC: 2.7 NG/ML (ref 0–9.2)

## 2024-10-16 ENCOUNTER — CLINICAL DOCUMENTATION (OUTPATIENT)
Age: 64
End: 2024-10-16

## 2024-10-16 NOTE — PROGRESS NOTES
Left message to change to same day VV or reschedule to next available follow in office with Casandra due to her schedule change

## 2025-01-03 ENCOUNTER — TRANSCRIBE ORDERS (OUTPATIENT)
Facility: HOSPITAL | Age: 65
End: 2025-01-03

## 2025-01-03 DIAGNOSIS — Z12.31 SCREENING MAMMOGRAM, ENCOUNTER FOR: Primary | ICD-10-CM

## 2025-01-21 ENCOUNTER — HOSPITAL ENCOUNTER (OUTPATIENT)
Facility: HOSPITAL | Age: 65
Discharge: HOME | End: 2025-01-24
Attending: SPECIALIST
Payer: MEDICARE

## 2025-01-21 DIAGNOSIS — Z12.31 SCREENING MAMMOGRAM, ENCOUNTER FOR: ICD-10-CM

## 2025-01-21 PROCEDURE — 77063 BREAST TOMOSYNTHESIS BI: CPT

## 2025-01-27 ENCOUNTER — TRANSCRIBE ORDERS (OUTPATIENT)
Facility: HOSPITAL | Age: 65
End: 2025-01-27

## 2025-01-27 DIAGNOSIS — Z78.0 ASYMPTOMATIC MENOPAUSAL STATE: Primary | ICD-10-CM

## 2025-01-29 ENCOUNTER — OFFICE VISIT (OUTPATIENT)
Dept: PRIMARY CARE CLINIC | Facility: CLINIC | Age: 65
End: 2025-01-29

## 2025-01-29 ENCOUNTER — TELEPHONE (OUTPATIENT)
Dept: PRIMARY CARE CLINIC | Facility: CLINIC | Age: 65
End: 2025-01-29

## 2025-01-29 VITALS
HEART RATE: 93 BPM | BODY MASS INDEX: 29.99 KG/M2 | WEIGHT: 180 LBS | DIASTOLIC BLOOD PRESSURE: 76 MMHG | TEMPERATURE: 99.6 F | SYSTOLIC BLOOD PRESSURE: 122 MMHG | HEIGHT: 65 IN | OXYGEN SATURATION: 96 % | RESPIRATION RATE: 18 BRPM

## 2025-01-29 DIAGNOSIS — J06.9 VIRAL URI: Primary | ICD-10-CM

## 2025-01-29 LAB
GROUP A STREP ANTIGEN, POC: NEGATIVE
LOT EXPIRE DATE: NORMAL
LOT KIT NUMBER: 375
QUICKVUE INFLUENZA TEST: NEGATIVE
RSV RNA, POC: NEGATIVE
SARS-COV-2, POC: NORMAL
VALID INTERNAL CONTROL, POC: YES
VALID INTERNAL CONTROL: YES
VENDOR AND KIT NAME POC: NORMAL

## 2025-01-29 RX ORDER — BENZONATATE 100 MG/1
100 CAPSULE ORAL 3 TIMES DAILY PRN
Qty: 30 CAPSULE | Refills: 0 | Status: SHIPPED | OUTPATIENT
Start: 2025-01-29

## 2025-01-29 SDOH — ECONOMIC STABILITY: FOOD INSECURITY: WITHIN THE PAST 12 MONTHS, YOU WORRIED THAT YOUR FOOD WOULD RUN OUT BEFORE YOU GOT MONEY TO BUY MORE.: NEVER TRUE

## 2025-01-29 SDOH — ECONOMIC STABILITY: FOOD INSECURITY: WITHIN THE PAST 12 MONTHS, THE FOOD YOU BOUGHT JUST DIDN'T LAST AND YOU DIDN'T HAVE MONEY TO GET MORE.: NEVER TRUE

## 2025-01-29 ASSESSMENT — PATIENT HEALTH QUESTIONNAIRE - PHQ9
1. LITTLE INTEREST OR PLEASURE IN DOING THINGS: NOT AT ALL
SUM OF ALL RESPONSES TO PHQ QUESTIONS 1-9: 0
SUM OF ALL RESPONSES TO PHQ9 QUESTIONS 1 & 2: 0
2. FEELING DOWN, DEPRESSED OR HOPELESS: NOT AT ALL
SUM OF ALL RESPONSES TO PHQ QUESTIONS 1-9: 0

## 2025-01-29 NOTE — PROGRESS NOTES
HPI     Chief Complaint   Patient presents with    Pharyngitis    Cough    Generalized Body Aches    Head Congestion    Ear Fullness     History of Present Illness  The patient is a 64-year-old female who presents for evaluation of sick symptoms.    She reports the onset of her symptoms was sudden, occurring yesterday after feeling well on Monday. She describes experiencing severe body aches, a persistent cough that disrupted her sleep, a sore and dry throat, and a headache. Her cough is non-productive in nature. She has not been in contact with any known sick individuals. She reports no chest pain or shortness of breath. She has not received an influenza vaccine this year. She initiated self-treatment with Mucinex yesterday and also took Advil to manage her fever and facilitate sleep.    MEDICATIONS  Mucinex, Advil    IMMUNIZATIONS  She is due for her influenza vaccine.     Reviewed PmHx, RxHx, FmHx, SocHx, AllgHx and updated and dated in the chart.    Physical Exam:  /76   Pulse 93   Temp 99.6 °F (37.6 °C) (Oral)   Resp 18   Ht 1.651 m (5' 5\")   Wt 81.6 kg (180 lb)   SpO2 96%   BMI 29.95 kg/m²     Physical Exam  WNWD NAD  RRR no murmur  CTA no wheezes ronchi rales  No focal deficits  Tms nml, pharynx nml  Clear nasal drainage       Results  POC FLU/ COVID/ STREP/ RSV NEG        Recent Results (from the past 12 hour(s))   AMB POC RAPID STREP A    Collection Time: 01/29/25 11:23 AM   Result Value Ref Range    Valid Internal Control, POC yes     Group A Strep Antigen, POC Negative    AMB POC RAPID INFLUENZA TEST    Collection Time: 01/29/25 11:39 AM   Result Value Ref Range    Valid Internal Control, POC yes     QuickVue Influenza test Negative    AMB POC RSV    Collection Time: 01/29/25 11:40 AM   Result Value Ref Range    Valid Internal Control, POC yes     RSV RNA, POC negative    AMB POC SARS-COV-2    Collection Time: 01/29/25 11:41 AM   Result Value Ref Range    SARS-COV-2, POC Not-Detected Not

## 2025-01-29 NOTE — PROGRESS NOTES
Health Decision Maker has been checked with the patient   Primary Decision Maker: Jesus Gaviria - Spouse - [phone]     AI form was signed    Chief Complaint   Patient presents with    Pharyngitis    Cough    Generalized Body Aches    Head Congestion    Ear Fullness       \"Have you been to the ER, urgent care clinic since your last visit?  Hospitalized since your last visit?\"    NO    “Have you seen or consulted any other health care providers outside of [facility] since your last visit?”    NO      There were no vitals filed for this visit.   Depression: Not at risk (1/29/2025)    PHQ-2     PHQ-2 Score: 0              Click Here for Release of Records Request    Chart reviewed: immunizations are documented.   Immunization History   Administered Date(s) Administered    COVID-19, J&J, (age 18y+), IM, 0.5 mL 05/10/2021    Influenza Virus Vaccine 10/15/2020    TDaP, ADACEL (age 10y-64y), BOOSTRIX (age 10y+), IM, 0.5mL 06/29/2016

## 2025-01-29 NOTE — TELEPHONE ENCOUNTER
Patient called requesting a same day sick appointment.  Has symptoms of cough, raw sore throat and body aches

## 2025-02-05 ENCOUNTER — CLINICAL DOCUMENTATION (OUTPATIENT)
Age: 65
End: 2025-02-05

## 2025-02-05 ENCOUNTER — TELEPHONE (OUTPATIENT)
Age: 65
End: 2025-02-05

## 2025-02-05 ENCOUNTER — OFFICE VISIT (OUTPATIENT)
Age: 65
End: 2025-02-05
Payer: MEDICARE

## 2025-02-05 VITALS
WEIGHT: 176.2 LBS | SYSTOLIC BLOOD PRESSURE: 110 MMHG | BODY MASS INDEX: 29.36 KG/M2 | TEMPERATURE: 98.2 F | OXYGEN SATURATION: 99 % | HEIGHT: 65 IN | DIASTOLIC BLOOD PRESSURE: 57 MMHG | HEART RATE: 69 BPM

## 2025-02-05 DIAGNOSIS — K74.60 CIRRHOSIS OF LIVER WITHOUT ASCITES, UNSPECIFIED HEPATIC CIRRHOSIS TYPE (HCC): Primary | ICD-10-CM

## 2025-02-05 PROCEDURE — 99214 OFFICE O/P EST MOD 30 MIN: CPT | Performed by: PHYSICIAN ASSISTANT

## 2025-02-05 PROCEDURE — G8400 PT W/DXA NO RESULTS DOC: HCPCS | Performed by: PHYSICIAN ASSISTANT

## 2025-02-05 PROCEDURE — 1036F TOBACCO NON-USER: CPT | Performed by: PHYSICIAN ASSISTANT

## 2025-02-05 PROCEDURE — G8427 DOCREV CUR MEDS BY ELIG CLIN: HCPCS | Performed by: PHYSICIAN ASSISTANT

## 2025-02-05 PROCEDURE — G8419 CALC BMI OUT NRM PARAM NOF/U: HCPCS | Performed by: PHYSICIAN ASSISTANT

## 2025-02-05 PROCEDURE — 1123F ACP DISCUSS/DSCN MKR DOCD: CPT | Performed by: PHYSICIAN ASSISTANT

## 2025-02-05 PROCEDURE — 3017F COLORECTAL CA SCREEN DOC REV: CPT | Performed by: PHYSICIAN ASSISTANT

## 2025-02-05 PROCEDURE — 1090F PRES/ABSN URINE INCON ASSESS: CPT | Performed by: PHYSICIAN ASSISTANT

## 2025-02-05 ASSESSMENT — ANXIETY QUESTIONNAIRES
IF YOU CHECKED OFF ANY PROBLEMS ON THIS QUESTIONNAIRE, HOW DIFFICULT HAVE THESE PROBLEMS MADE IT FOR YOU TO DO YOUR WORK, TAKE CARE OF THINGS AT HOME, OR GET ALONG WITH OTHER PEOPLE: NOT DIFFICULT AT ALL
1. FEELING NERVOUS, ANXIOUS, OR ON EDGE: NOT AT ALL
6. BECOMING EASILY ANNOYED OR IRRITABLE: NOT AT ALL
GAD7 TOTAL SCORE: 0
5. BEING SO RESTLESS THAT IT IS HARD TO SIT STILL: NOT AT ALL
3. WORRYING TOO MUCH ABOUT DIFFERENT THINGS: NOT AT ALL
4. TROUBLE RELAXING: NOT AT ALL
7. FEELING AFRAID AS IF SOMETHING AWFUL MIGHT HAPPEN: NOT AT ALL
2. NOT BEING ABLE TO STOP OR CONTROL WORRYING: NOT AT ALL

## 2025-02-05 ASSESSMENT — PATIENT HEALTH QUESTIONNAIRE - PHQ9
SUM OF ALL RESPONSES TO PHQ QUESTIONS 1-9: 0
DEPRESSION UNABLE TO ASSESS: FUNCTIONAL CAPACITY MOTIVATION LIMITS ACCURACY
SUM OF ALL RESPONSES TO PHQ QUESTIONS 1-9: 0
SUM OF ALL RESPONSES TO PHQ9 QUESTIONS 1 & 2: 0
1. LITTLE INTEREST OR PLEASURE IN DOING THINGS: NOT AT ALL
SUM OF ALL RESPONSES TO PHQ QUESTIONS 1-9: 0
2. FEELING DOWN, DEPRESSED OR HOPELESS: NOT AT ALL
SUM OF ALL RESPONSES TO PHQ QUESTIONS 1-9: 0

## 2025-02-05 NOTE — PROGRESS NOTES
[facility]      Tee Grant MD, FACP, FACG, FAASLD      AZAR Burgos-ROBIN Hernandez, [facility]   Saranya Guillermojuanitalucius, [facility]   Neda Waqas, [facility]  Edwin Harvey, [facility]   Arelis Morgan, [facility]   Elaine Aston, [facility]   [address], [address]   [place]  [address] [phone]   FAX: [phone]  [facility]   [address]   [place]  [address] [phone]   FAX: [phone]     Patient Care Team:  Lu Navas DO as PCP - General (Family Medicine)  Lu Navas DO as PCP - Empaneled Provider  Bobbi Beth DO    Patient Active Problem List   Diagnosis    Liver cirrhosis secondary to TREVINO (HCC)    Giant cell arteritis (HCC)    Hypercholesterolemia    Hypermetropia    Presbyopia    Sebaceoma    Sjogren's syndrome (HCC)    Uterine leiomyoma    Vitamin D deficiency     Polly Gaviria is being seen at [facility] for management of 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 The active problem list, all pertinent past medical history, medications, endoscopic studies, radiologic findings and laboratory findings related to the liver disorder were reviewed and discussed with the patient.      The patient is a 64 y.o.  female who was found to have chronic liver disease and cirrhosis in 1/2022 when she underwent imaging studies to assess long-standing history of abdominal pain. The imaging suggested cirrhotic morphology of the liver.     Lab values have shown normal (upper limit) liver enzymes and function, minimal elevation of ALP, borderline low platelet count.      An assessment of liver fibrosis with Fibroscan was performed at her initial office visit, this showed EkPa was 23.6.  Suggested fibrosis

## 2025-02-05 NOTE — PROGRESS NOTES
Chief Complaint   Patient presents with    Follow-up     N/A     Vitals:    02/05/25 1020   BP: (!) 110/57   Site: Left Upper Arm   Position: Sitting   Pulse: 69   Temp: 98.2 °F (36.8 °C)   TempSrc: Temporal   SpO2: 99%   Weight: 79.9 kg (176 lb 3.2 oz)   Height: 1.651 m (5' 5\")     .  \"Have you been to the ER, urgent care clinic since your last visit?  Hospitalized since your last visit?\"    NO    “Have you seen or consulted any other health care providers outside of [facility] since your last visit?”    NO            Click Here for Release of Records Request

## 2025-02-05 NOTE — TELEPHONE ENCOUNTER
Casandra Putnam, Leona Love LPN  Please call Dr Mcghee's (Tee valentin) office for copy of this patient's recent EGD/colon from 1/2025.    2/5/25 1047am: Copy of EGD/Colon from 1/2025 requested from Dr Mcghee's office. SQ

## 2025-02-05 NOTE — PROGRESS NOTES
Chart reviewed today for consideration in ALTUS clinical trial.  Discussed opportunity with patient over the phone.  Screening visit scheduled for 19 Feb 2025 and abdominal ultrasound scheduled on the same day following clinic visit.  ICF mailed to home address for patient's review prior to visit.

## 2025-02-26 ENCOUNTER — HOSPITAL ENCOUNTER (OUTPATIENT)
Facility: HOSPITAL | Age: 65
Discharge: HOME | End: 2025-03-01
Payer: MEDICARE

## 2025-02-26 ENCOUNTER — NURSE ONLY (OUTPATIENT)
Age: 65
End: 2025-02-26

## 2025-02-26 VITALS
BODY MASS INDEX: 29.06 KG/M2 | DIASTOLIC BLOOD PRESSURE: 74 MMHG | RESPIRATION RATE: 15 BRPM | SYSTOLIC BLOOD PRESSURE: 120 MMHG | TEMPERATURE: 98.4 F | HEART RATE: 83 BPM | WEIGHT: 174.6 LBS

## 2025-02-26 DIAGNOSIS — K74.60 CIRRHOSIS OF LIVER WITHOUT ASCITES, UNSPECIFIED HEPATIC CIRRHOSIS TYPE (HCC): ICD-10-CM

## 2025-02-26 DIAGNOSIS — K74.60 CIRRHOSIS OF LIVER WITHOUT ASCITES, UNSPECIFIED HEPATIC CIRRHOSIS TYPE (HCC): Primary | ICD-10-CM

## 2025-02-26 DIAGNOSIS — Z00.6 RESEARCH EXAM: Primary | ICD-10-CM

## 2025-02-26 PROCEDURE — 76700 US EXAM ABDOM COMPLETE: CPT

## 2025-02-26 RX ORDER — CLINDAMYCIN PHOSPHATE 10 MG/G
GEL TOPICAL
COMMUNITY
End: 2025-02-26

## 2025-02-26 RX ORDER — B-COMPLEX WITH VITAMIN C
1 TABLET ORAL
COMMUNITY

## 2025-02-26 RX ORDER — ADAPALENE GEL USP, 0.3% 3 MG/G
GEL TOPICAL
COMMUNITY
End: 2025-02-26

## 2025-02-26 RX ORDER — OMEPRAZOLE 40 MG/1
1 CAPSULE, DELAYED RELEASE ORAL DAILY
COMMUNITY

## 2025-02-26 RX ORDER — ESTRADIOL 0.1 MG/G
CREAM VAGINAL
COMMUNITY
Start: 2025-01-27

## 2025-02-27 LAB
ALBUMIN SERPL-MCNC: 4.1 G/DL (ref 3.5–5)
ALBUMIN/GLOB SERPL: 1.1 (ref 1.1–2.2)
ALP SERPL-CCNC: 124 U/L (ref 45–117)
ALT SERPL-CCNC: 36 U/L (ref 12–78)
ANION GAP SERPL CALC-SCNC: 5 MMOL/L (ref 2–12)
AST SERPL-CCNC: 40 U/L (ref 15–37)
BILIRUB DIRECT SERPL-MCNC: 0.2 MG/DL (ref 0–0.2)
BILIRUB SERPL-MCNC: 0.6 MG/DL (ref 0.2–1)
BUN SERPL-MCNC: 10 MG/DL (ref 6–20)
BUN/CREAT SERPL: 14 (ref 12–20)
CALCIUM SERPL-MCNC: 9.9 MG/DL (ref 8.5–10.1)
CHLORIDE SERPL-SCNC: 104 MMOL/L (ref 97–108)
CO2 SERPL-SCNC: 27 MMOL/L (ref 21–32)
CREAT SERPL-MCNC: 0.74 MG/DL (ref 0.55–1.02)
ERYTHROCYTE [DISTWIDTH] IN BLOOD BY AUTOMATED COUNT: 13.8 % (ref 11.5–14.5)
GLOBULIN SER CALC-MCNC: 3.9 G/DL (ref 2–4)
GLUCOSE SERPL-MCNC: 86 MG/DL (ref 65–100)
HCT VFR BLD AUTO: 43.4 % (ref 35–47)
HGB BLD-MCNC: 14.1 G/DL (ref 11.5–16)
INR PPP: 1 (ref 0.9–1.1)
MCH RBC QN AUTO: 28.7 PG (ref 26–34)
MCHC RBC AUTO-ENTMCNC: 32.5 G/DL (ref 30–36.5)
MCV RBC AUTO: 88.4 FL (ref 80–99)
NRBC # BLD: 0 K/UL (ref 0–0.01)
NRBC BLD-RTO: 0 PER 100 WBC
PLATELET # BLD AUTO: 161 K/UL (ref 150–400)
PMV BLD AUTO: 11 FL (ref 8.9–12.9)
POTASSIUM SERPL-SCNC: 3.8 MMOL/L (ref 3.5–5.1)
PROT SERPL-MCNC: 8 G/DL (ref 6.4–8.2)
PROTHROMBIN TIME: 10.7 SEC (ref 9.2–11.2)
RBC # BLD AUTO: 4.91 M/UL (ref 3.8–5.2)
SODIUM SERPL-SCNC: 136 MMOL/L (ref 136–145)
WBC # BLD AUTO: 4.3 K/UL (ref 3.6–11)

## 2025-03-03 LAB
AFP L3 MFR SERPL: NORMAL % (ref 0–9.9)
AFP SERPL-MCNC: 2.2 NG/ML (ref 0–9.2)

## 2025-03-06 DIAGNOSIS — Z00.6 EXAMINATION OF PARTICIPANT IN CLINICAL TRIAL: Primary | ICD-10-CM

## 2025-03-12 ENCOUNTER — HOSPITAL ENCOUNTER (OUTPATIENT)
Facility: HOSPITAL | Age: 65
Discharge: HOME | End: 2025-03-15

## 2025-03-12 DIAGNOSIS — Z00.6 EXAMINATION OF PARTICIPANT IN CLINICAL TRIAL: ICD-10-CM

## 2025-03-12 PROCEDURE — 74183 MRI ABD W/O CNTR FLWD CNTR: CPT

## 2025-03-12 PROCEDURE — 6360000004 HC RX CONTRAST MEDICATION: Performed by: PHYSICIAN ASSISTANT

## 2025-03-12 PROCEDURE — A9579 GAD-BASE MR CONTRAST NOS,1ML: HCPCS | Performed by: PHYSICIAN ASSISTANT

## 2025-03-12 RX ORDER — 0.9 % SODIUM CHLORIDE 0.9 %
100 INTRAVENOUS SOLUTION INTRAVENOUS ONCE
Status: DISCONTINUED | OUTPATIENT
Start: 2025-03-12 | End: 2025-03-16 | Stop reason: HOSPADM

## 2025-03-12 RX ADMIN — Medication 100 ML: at 12:28

## 2025-03-12 RX ADMIN — GADOTERIDOL 16 ML: 279.3 INJECTION, SOLUTION INTRAVENOUS at 12:28

## 2025-03-14 ENCOUNTER — TRANSCRIBE ORDERS (OUTPATIENT)
Facility: HOSPITAL | Age: 65
End: 2025-03-14

## 2025-03-14 DIAGNOSIS — R10.31 RIGHT LOWER QUADRANT PAIN: Primary | ICD-10-CM

## 2025-03-19 ENCOUNTER — RESULTS FOLLOW-UP (OUTPATIENT)
Facility: HOSPITAL | Age: 65
End: 2025-03-19

## 2025-05-09 ENCOUNTER — OFFICE VISIT (OUTPATIENT)
Dept: PRIMARY CARE CLINIC | Facility: CLINIC | Age: 65
End: 2025-05-09

## 2025-05-09 VITALS
RESPIRATION RATE: 14 BRPM | DIASTOLIC BLOOD PRESSURE: 75 MMHG | SYSTOLIC BLOOD PRESSURE: 115 MMHG | HEART RATE: 67 BPM | OXYGEN SATURATION: 94 % | WEIGHT: 177 LBS | HEIGHT: 65 IN | BODY MASS INDEX: 29.49 KG/M2 | TEMPERATURE: 97.5 F

## 2025-05-09 DIAGNOSIS — H92.03 OTALGIA OF BOTH EARS: ICD-10-CM

## 2025-05-09 DIAGNOSIS — R05.1 ACUTE COUGH: ICD-10-CM

## 2025-05-09 DIAGNOSIS — J02.9 PHARYNGITIS, UNSPECIFIED ETIOLOGY: Primary | ICD-10-CM

## 2025-05-09 LAB
GROUP A STREP ANTIGEN, POC: NEGATIVE
VALID INTERNAL CONTROL, POC: YES

## 2025-05-09 RX ORDER — AZITHROMYCIN 250 MG/1
TABLET, FILM COATED ORAL
Qty: 6 TABLET | Refills: 0 | Status: SHIPPED | OUTPATIENT
Start: 2025-05-09 | End: 2025-05-19

## 2025-05-09 ASSESSMENT — PATIENT HEALTH QUESTIONNAIRE - PHQ9
2. FEELING DOWN, DEPRESSED OR HOPELESS: NOT AT ALL
1. LITTLE INTEREST OR PLEASURE IN DOING THINGS: NOT AT ALL
SUM OF ALL RESPONSES TO PHQ QUESTIONS 1-9: 0

## 2025-05-09 ASSESSMENT — ENCOUNTER SYMPTOMS
SORE THROAT: 1
COUGH: 1
SHORTNESS OF BREATH: 0
SINUS PRESSURE: 1

## 2025-05-09 NOTE — PROGRESS NOTES
Have you been to the ER, urgent care clinic since your last visit?  Hospitalized since your last visit?   NO    Have you seen or consulted any other health care providers outside our system since your last visit?   NO           
Final    Hemoglobin 02/26/2025 14.1  11.5 - 16.0 g/dL Final    Hematocrit 02/26/2025 43.4  35.0 - 47.0 % Final    MCV 02/26/2025 88.4  80.0 - 99.0 FL Final    MCH 02/26/2025 28.7  26.0 - 34.0 PG Final    MCHC 02/26/2025 32.5  30.0 - 36.5 g/dL Final    RDW 02/26/2025 13.8  11.5 - 14.5 % Final    Platelets 02/26/2025 161  150 - 400 K/uL Final    MPV 02/26/2025 11.0  8.9 - 12.9 FL Final    Nucleated RBCs 02/26/2025 0.0  0  WBC Final    nRBC 02/26/2025 0.00  0.00 - 0.01 K/uL Final    AFP (Alpha Fetoprotein) 02/26/2025 2.2  0.0 - 9.2 ng/mL Final    Comment: (NOTE)  AFP and AFP-L3% measured by [employer] Liquid Phase Binding  Methodology.  Values obtained with different assay methods or kits cannot be used  interchangeably. Results cannot be interpreted as absolute evidence  of the presence or absence of malignant disease.  This test is not interpretable in pregnant females.      AFP (Alpha-fetoprotein) L3% 02/26/2025 Comment  0.0 - 9.9 % Final    Comment: (NOTE)  AFP-L3% result is below the detection limit of the assay.  Performed At: [address] [id number]  Dustin Cisse MD Ph:[phone]      INR 02/26/2025 1.0  0.9 - 1.1   Final    A single therapeutic range for Vit K antagonists may not be optimal for all indications - see June, 2008 issue of Chest, American College of Chest Physicians Evidence-Based Clinical Practice Guidelines, 8th Edition.    Protime 02/26/2025 10.7  9.2 - 11.2 sec Final        REVIEW OF SYSTEMS   Review of Systems   HENT:  Positive for ear pain, postnasal drip, sinus pressure and sore throat.    Respiratory:  Positive for cough. Negative for shortness of breath.    Cardiovascular:  Negative for chest pain and palpitations.       PHYSICAL EXAM   /75 (BP Site: Left Upper Arm, Patient Position: Sitting, BP Cuff Size: Medium Adult)   Pulse 67   Temp 97.5 °F (36.4 °C) (Temporal)   Resp 14   Ht 1.651 m (5' 5\")   Wt 80.3 kg (177 lb)

## 2025-05-27 ENCOUNTER — TELEPHONE (OUTPATIENT)
Dept: PRIMARY CARE CLINIC | Facility: CLINIC | Age: 65
End: 2025-05-27

## 2025-05-27 SDOH — HEALTH STABILITY: PHYSICAL HEALTH: ON AVERAGE, HOW MANY DAYS PER WEEK DO YOU ENGAGE IN MODERATE TO STRENUOUS EXERCISE (LIKE A BRISK WALK)?: 3 DAYS

## 2025-05-27 SDOH — HEALTH STABILITY: PHYSICAL HEALTH: ON AVERAGE, HOW MANY MINUTES DO YOU ENGAGE IN EXERCISE AT THIS LEVEL?: 30 MIN

## 2025-05-27 ASSESSMENT — LIFESTYLE VARIABLES
HOW MANY STANDARD DRINKS CONTAINING ALCOHOL DO YOU HAVE ON A TYPICAL DAY: 1
HOW OFTEN DO YOU HAVE A DRINK CONTAINING ALCOHOL: 4
HOW MANY STANDARD DRINKS CONTAINING ALCOHOL DO YOU HAVE ON A TYPICAL DAY: 1 OR 2
HOW OFTEN DO YOU HAVE SIX OR MORE DRINKS ON ONE OCCASION: 1
HOW OFTEN DO YOU HAVE A DRINK CONTAINING ALCOHOL: 2-3 TIMES A WEEK

## 2025-05-27 ASSESSMENT — PATIENT HEALTH QUESTIONNAIRE - PHQ9
2. FEELING DOWN, DEPRESSED OR HOPELESS: NOT AT ALL
SUM OF ALL RESPONSES TO PHQ QUESTIONS 1-9: 0
1. LITTLE INTEREST OR PLEASURE IN DOING THINGS: NOT AT ALL
SUM OF ALL RESPONSES TO PHQ QUESTIONS 1-9: 0

## 2025-05-29 ENCOUNTER — OFFICE VISIT (OUTPATIENT)
Dept: PRIMARY CARE CLINIC | Facility: CLINIC | Age: 65
End: 2025-05-29

## 2025-05-29 VITALS
SYSTOLIC BLOOD PRESSURE: 115 MMHG | OXYGEN SATURATION: 99 % | WEIGHT: 177.4 LBS | HEIGHT: 65 IN | HEART RATE: 70 BPM | DIASTOLIC BLOOD PRESSURE: 74 MMHG | RESPIRATION RATE: 18 BRPM | TEMPERATURE: 97.9 F | BODY MASS INDEX: 29.56 KG/M2

## 2025-05-29 DIAGNOSIS — Z00.00 WELCOME TO MEDICARE PREVENTIVE VISIT: Primary | ICD-10-CM

## 2025-05-29 DIAGNOSIS — N28.1 RENAL CYST: ICD-10-CM

## 2025-05-29 DIAGNOSIS — E78.5 HYPERLIPIDEMIA, UNSPECIFIED HYPERLIPIDEMIA TYPE: ICD-10-CM

## 2025-05-29 DIAGNOSIS — Z71.89 ACP (ADVANCE CARE PLANNING): ICD-10-CM

## 2025-05-29 LAB
APPEARANCE UR: ABNORMAL
BACTERIA URNS QL MICRO: ABNORMAL /HPF
BILIRUB UR QL: NEGATIVE
CAOX CRY URNS QL MICRO: ABNORMAL
CHOLEST SERPL-MCNC: 237 MG/DL
COLOR UR: ABNORMAL
COMMENT:: NORMAL
EPITH CASTS URNS QL MICRO: ABNORMAL /LPF
GLUCOSE UR STRIP.AUTO-MCNC: NEGATIVE MG/DL
HDLC SERPL-MCNC: 65 MG/DL
HDLC SERPL: 3.6 (ref 0–5)
HGB UR QL STRIP: NEGATIVE
KETONES UR QL STRIP.AUTO: ABNORMAL MG/DL
LDLC SERPL CALC-MCNC: 147.6 MG/DL (ref 0–100)
LEUKOCYTE ESTERASE UR QL STRIP.AUTO: NEGATIVE
NITRITE UR QL STRIP.AUTO: NEGATIVE
PH UR STRIP: 6.5 (ref 5–8)
PROT UR STRIP-MCNC: NEGATIVE MG/DL
RBC #/AREA URNS HPF: ABNORMAL /HPF (ref 0–5)
SP GR UR REFRACTOMETRY: 1.02 (ref 1–1.03)
SPECIMEN HOLD: NORMAL
TRIGL SERPL-MCNC: 122 MG/DL
UROBILINOGEN UR QL STRIP.AUTO: 1 EU/DL (ref 0.2–1)
VLDLC SERPL CALC-MCNC: 24.4 MG/DL
WBC URNS QL MICRO: ABNORMAL /HPF (ref 0–4)

## 2025-05-29 ASSESSMENT — VISUAL ACUITY
OS_CC: 20/20
OD_CC: 20/20

## 2025-05-29 NOTE — PROGRESS NOTES
Health Decision Maker has been checked with the patient   Primary Decision Maker: Jesus Gaviria - Spouse - [phone]     AI form was signed    Chief Complaint   Patient presents with    Medicare AWV       \"Have you been to the ER, urgent care clinic since your last visit?  Hospitalized since your last visit?\"    NO    “Have you seen or consulted any other health care providers outside of [facility] since your last visit?”    NO      Vitals:    05/29/25 0937   TempSrc: Oral   Weight: 80.5 kg (177 lb 6.4 oz)   Height: 1.651 m (5' 5\")      Depression: Not at risk (5/27/2025)    PHQ-2     PHQ-2 Score: 0              Click Here for Release of Records Request    Chart reviewed: immunizations are documented.   Immunization History   Administered Date(s) Administered    COVID-19, J&J, (age 18y+), IM, 0.5 mL 05/10/2021    Influenza Virus Vaccine 10/15/2020    TDaP, ADACEL (age 10y-64y), BOOSTRIX (age 10y+), IM, 0.5mL 06/29/2016

## 2025-05-29 NOTE — PROGRESS NOTES
Medicare Annual Wellness Visit    Polly Gaviria is here for Medicare AWV    Assessment & Plan   Welcome to Medicare preventive visit  -     Welcome to Medicare (IPPE) Screening EKG  -     VISUAL SCREENING TEST, BILAT  Hyperlipidemia, unspecified hyperlipidemia type  -     Lipid Panel; Future  Renal cyst  -     Urinalysis with Microscopic; Future  ACP (advance care planning)  -     MD Advanced Care Planning (16-30 minutes) [21502]     Check UA for hematuria/ proteinuria.  Check lipid panel. Not on meds for HLD.     No follow-ups on file.     Subjective   The following acute and/or chronic problems were also addressed today:    Currently followed by Hepatology for liver cyst. Currently enrolled in liver study.     Noted a renal cyst that was benign on last MRI.     Has HLD not on meds.     Patient's complete Health Risk Assessment and screening values have been reviewed and are found in Flowsheets. The following problems were reviewed today and where indicated follow up appointments were made and/or referrals ordered.    - COVID vaccine  - Hep A vaccine  - PNA vaccine  - Shingles vaccines  - Hep B vaccine  - RSV vaccine  - DEXA already ordered  - UTD on Mammo   - Colon cancer screening due in 2033, followed by Dr. Grimaldo   - still doing PAPs with GYN    Positive Risk Factor Screenings with Interventions:                      Advanced Directives:  Do you have a Living Will?: (!) (Proxy-Rptd) No    Intervention:  has NO advanced directive - information provided   would be primary decision maker  Did discuss option of ACP referral     Advance Care Planning   Discussed the patient’s choices for care and treatment preferences in case of a health event that adversely affects decision-making abilities or is life-limiting. Recommended the patient document care preferences in state-specific advance directives. Also reviewed the process of designating a trusted capable adult as an Agent (or Health Care Power of

## 2025-05-30 ENCOUNTER — HOSPITAL ENCOUNTER (OUTPATIENT)
Facility: HOSPITAL | Age: 65
Discharge: HOME | End: 2025-05-30
Attending: SPECIALIST
Payer: MEDICARE

## 2025-05-30 DIAGNOSIS — Z78.0 ASYMPTOMATIC MENOPAUSAL STATE: ICD-10-CM

## 2025-05-30 PROCEDURE — 77080 DXA BONE DENSITY AXIAL: CPT

## 2025-06-01 ENCOUNTER — RESULTS FOLLOW-UP (OUTPATIENT)
Dept: PRIMARY CARE CLINIC | Facility: CLINIC | Age: 65
End: 2025-06-01

## 2025-07-31 DIAGNOSIS — K74.60 CIRRHOSIS OF LIVER WITHOUT ASCITES, UNSPECIFIED HEPATIC CIRRHOSIS TYPE (HCC): Primary | ICD-10-CM

## 2025-08-01 ENCOUNTER — CLINICAL DOCUMENTATION (OUTPATIENT)
Age: 65
End: 2025-08-01

## 2025-08-04 ENCOUNTER — CLINICAL DOCUMENTATION (OUTPATIENT)
Age: 65
End: 2025-08-04

## 2025-08-04 DIAGNOSIS — K74.69 OTHER CIRRHOSIS OF LIVER (HCC): Primary | ICD-10-CM

## 2025-08-04 DIAGNOSIS — R16.0 LIVER MASS, RIGHT LOBE: ICD-10-CM

## 2025-09-05 ENCOUNTER — HOSPITAL ENCOUNTER (OUTPATIENT)
Facility: HOSPITAL | Age: 65
Discharge: HOME | End: 2025-09-05
Payer: MEDICARE

## 2025-09-05 DIAGNOSIS — R16.0 LIVER MASS, RIGHT LOBE: ICD-10-CM

## 2025-09-05 DIAGNOSIS — K74.69 OTHER CIRRHOSIS OF LIVER (HCC): ICD-10-CM

## 2025-09-05 PROCEDURE — A9575 INJ GADOTERATE MEGLUMI 0.1ML: HCPCS | Performed by: RADIOLOGY

## 2025-09-05 PROCEDURE — 74183 MRI ABD W/O CNTR FLWD CNTR: CPT

## 2025-09-05 PROCEDURE — 6360000004 HC RX CONTRAST MEDICATION: Performed by: RADIOLOGY

## 2025-09-05 RX ORDER — GADOTERATE MEGLUMINE 376.9 MG/ML
15 INJECTION INTRAVENOUS
Status: COMPLETED | OUTPATIENT
Start: 2025-09-05 | End: 2025-09-05

## 2025-09-05 RX ADMIN — GADOTERATE MEGLUMINE 15 ML: 376.9 INJECTION INTRAVENOUS at 10:53
